# Patient Record
Sex: FEMALE | Race: WHITE | Employment: FULL TIME | URBAN - NONMETROPOLITAN AREA
[De-identification: names, ages, dates, MRNs, and addresses within clinical notes are randomized per-mention and may not be internally consistent; named-entity substitution may affect disease eponyms.]

---

## 2017-01-09 DIAGNOSIS — F90.9 ATTENTION DEFICIT HYPERACTIVITY DISORDER (ADHD), UNSPECIFIED ADHD TYPE: ICD-10-CM

## 2017-01-09 NOTE — TELEPHONE ENCOUNTER
From: Jacki Pollard  To: Jillian Frank DO  Sent: 1/9/2017 8:22 AM CST  Subject: Medication Renewal Request    Original authorizing provider: DO Jacki Oliva would like a refill of the following medications:  Methylphenidate

## 2017-01-10 RX ORDER — METHYLPHENIDATE HYDROCHLORIDE 54 MG/1
54 TABLET ORAL EVERY MORNING
Qty: 30 TABLET | Refills: 0 | Status: SHIPPED | OUTPATIENT
Start: 2017-01-10 | End: 2017-02-08

## 2017-02-08 DIAGNOSIS — F90.2 ATTENTION DEFICIT HYPERACTIVITY DISORDER (ADHD), COMBINED TYPE: ICD-10-CM

## 2017-02-08 DIAGNOSIS — F90.9 ATTENTION DEFICIT HYPERACTIVITY DISORDER (ADHD), UNSPECIFIED ADHD TYPE: ICD-10-CM

## 2017-02-08 RX ORDER — METHYLPHENIDATE HYDROCHLORIDE 54 MG/1
54 TABLET ORAL EVERY MORNING
Qty: 30 TABLET | Refills: 0 | Status: CANCELLED | OUTPATIENT
Start: 2017-02-08

## 2017-02-08 RX ORDER — METHYLPHENIDATE HYDROCHLORIDE 10 MG/1
10 TABLET ORAL DAILY
Qty: 30 TABLET | Refills: 0 | Status: SHIPPED | OUTPATIENT
Start: 2017-04-08 | End: 2017-05-08

## 2017-02-08 RX ORDER — METHYLPHENIDATE HYDROCHLORIDE 10 MG/1
10 TABLET ORAL DAILY
Qty: 30 TABLET | Refills: 0 | Status: CANCELLED | OUTPATIENT
Start: 2017-02-08 | End: 2017-03-10

## 2017-02-08 RX ORDER — METHYLPHENIDATE HYDROCHLORIDE 10 MG/1
10 TABLET ORAL DAILY
Qty: 30 TABLET | Refills: 0 | Status: SHIPPED | OUTPATIENT
Start: 2017-02-08 | End: 2017-08-16

## 2017-02-08 RX ORDER — METHYLPHENIDATE HYDROCHLORIDE 10 MG/1
10 TABLET ORAL DAILY
Qty: 30 TABLET | Refills: 0 | Status: SHIPPED | OUTPATIENT
Start: 2017-03-08 | End: 2017-04-08

## 2017-02-08 RX ORDER — MONTELUKAST SODIUM 10 MG/1
10 TABLET ORAL
Qty: 90 TABLET | Refills: 3 | Status: SHIPPED | OUTPATIENT
Start: 2017-02-08 | End: 2018-01-22

## 2017-02-08 RX ORDER — METHYLPHENIDATE HYDROCHLORIDE 54 MG/1
54 TABLET ORAL EVERY MORNING
Qty: 30 TABLET | Refills: 0 | Status: SHIPPED | OUTPATIENT
Start: 2017-03-08 | End: 2017-04-08

## 2017-02-08 RX ORDER — METHYLPHENIDATE HYDROCHLORIDE 54 MG/1
54 TABLET ORAL EVERY MORNING
Qty: 30 TABLET | Refills: 0 | Status: SHIPPED | OUTPATIENT
Start: 2017-02-08 | End: 2017-11-20 | Stop reason: ALTCHOICE

## 2017-02-08 RX ORDER — METHYLPHENIDATE HYDROCHLORIDE 54 MG/1
54 TABLET ORAL EVERY MORNING
Qty: 30 TABLET | Refills: 0 | Status: SHIPPED | OUTPATIENT
Start: 2017-04-08 | End: 2017-05-08

## 2017-02-08 NOTE — TELEPHONE ENCOUNTER
Last office visit 12-6-16 110/60  Last refill methylphenidate 10mg 11-30-16 #30  Last refill Montelukast 4-8-15 #90 with 3 refills.   Last refill Methylphenidate ER 1-10-17 #30  ACT & AAP UTD

## 2017-02-08 NOTE — TELEPHONE ENCOUNTER
From: Virgilio Barnett  To: Laith Echavarria DO  Sent: 2/8/2017 8:02 AM CST  Subject: Medication Renewal Request    Original authorizing provider: DO Virgilio Caceres would like a refill of the following medications:  methylphenidate

## 2017-02-17 RX ORDER — LEVOTHYROXINE SODIUM 0.03 MG/1
TABLET ORAL
Qty: 90 TABLET | Refills: 2 | Status: SHIPPED | OUTPATIENT
Start: 2017-02-17 | End: 2017-11-16

## 2017-04-03 NOTE — TELEPHONE ENCOUNTER
From: Bharti Dias  To: Lonnie Mina DO  Sent: 4/2/2017 3:59 PM CDT  Subject: Medication Renewal Request    Original authorizing provider: DO Bharti Houston would like a refill of the following medications:  VENLAFAXINE HCL ER

## 2017-04-04 RX ORDER — VENLAFAXINE HYDROCHLORIDE 75 MG/1
75 CAPSULE, EXTENDED RELEASE ORAL DAILY
Qty: 90 CAPSULE | Refills: 1 | Status: SHIPPED | OUTPATIENT
Start: 2017-04-04 | End: 2017-10-12

## 2017-05-08 DIAGNOSIS — F90.9 ATTENTION DEFICIT HYPERACTIVITY DISORDER (ADHD), UNSPECIFIED ADHD TYPE: ICD-10-CM

## 2017-05-08 DIAGNOSIS — G47.00 INSOMNIA, UNSPECIFIED TYPE: Primary | ICD-10-CM

## 2017-05-09 RX ORDER — ZOLPIDEM TARTRATE 10 MG/1
10 TABLET ORAL NIGHTLY
Qty: 90 TABLET | Refills: 0 | Status: SHIPPED | OUTPATIENT
Start: 2017-05-09 | End: 2017-08-14

## 2017-05-09 RX ORDER — MONTELUKAST SODIUM 10 MG/1
10 TABLET ORAL
Qty: 90 TABLET | Refills: 3 | Status: CANCELLED | OUTPATIENT
Start: 2017-05-09

## 2017-05-09 RX ORDER — METHYLPHENIDATE HYDROCHLORIDE 54 MG/1
54 TABLET ORAL DAILY
Qty: 30 TABLET | Refills: 0 | Status: SHIPPED | OUTPATIENT
Start: 2017-05-09 | End: 2017-08-07

## 2017-05-09 RX ORDER — METHYLPHENIDATE HYDROCHLORIDE 54 MG/1
54 TABLET ORAL DAILY
Qty: 30 TABLET | Refills: 0 | Status: SHIPPED | OUTPATIENT
Start: 2017-06-08 | End: 2017-07-08

## 2017-05-09 RX ORDER — VENLAFAXINE HYDROCHLORIDE 75 MG/1
75 CAPSULE, EXTENDED RELEASE ORAL DAILY
Qty: 90 CAPSULE | Refills: 1 | Status: CANCELLED | OUTPATIENT
Start: 2017-05-09

## 2017-05-09 RX ORDER — METHYLPHENIDATE HYDROCHLORIDE 54 MG/1
54 TABLET ORAL DAILY
Qty: 30 TABLET | Refills: 0 | Status: SHIPPED | OUTPATIENT
Start: 2017-07-08 | End: 2017-08-07

## 2017-05-09 RX ORDER — LEVOTHYROXINE SODIUM 0.03 MG/1
TABLET ORAL
Qty: 90 TABLET | Refills: 2 | Status: CANCELLED | OUTPATIENT
Start: 2017-05-09

## 2017-05-09 RX ORDER — METHYLPHENIDATE HYDROCHLORIDE 54 MG/1
54 TABLET ORAL EVERY MORNING
Qty: 30 TABLET | Refills: 0 | Status: CANCELLED | OUTPATIENT
Start: 2017-05-09 | End: 2017-06-08

## 2017-05-09 NOTE — TELEPHONE ENCOUNTER
Last office visit on 12/6/16  Last refill on nexium x 1 year on 9/30/16  Last refill on ambien #90 on 12/4/16  Last refill on concerta 4/8

## 2017-05-09 NOTE — TELEPHONE ENCOUNTER
From: Fran Hoyt  To: Mariusz Chan DO  Sent: 5/8/2017 10:15 PM CDT  Subject: Medication Renewal Request    Original authorizing provider: DO Fran Chávez would like a refill of the following medications:  NEXIUM 40 MG O

## 2017-08-07 ENCOUNTER — TELEPHONE (OUTPATIENT)
Dept: FAMILY MEDICINE CLINIC | Facility: CLINIC | Age: 57
End: 2017-08-07

## 2017-08-07 DIAGNOSIS — F90.9 ATTENTION DEFICIT HYPERACTIVITY DISORDER (ADHD), UNSPECIFIED ADHD TYPE: ICD-10-CM

## 2017-08-07 NOTE — TELEPHONE ENCOUNTER
From: Nikolay Louise  Sent: 8/7/2017 9:38 AM CDT  Subject: Medication Renewal Request    Nikolay Louise would like a refill of the following medications:  Methylphenidate HCl ER (CONCERTA) 54 MG Oral Tab CR [CHRISTIAN Bledsoe, DO]    Preferred pharm

## 2017-08-08 RX ORDER — METHYLPHENIDATE HYDROCHLORIDE 54 MG/1
54 TABLET ORAL DAILY
Qty: 30 TABLET | Refills: 0 | Status: SHIPPED | OUTPATIENT
Start: 2017-08-08 | End: 2017-11-20 | Stop reason: ALTCHOICE

## 2017-08-14 DIAGNOSIS — G47.00 INSOMNIA, UNSPECIFIED TYPE: ICD-10-CM

## 2017-08-15 RX ORDER — ZOLPIDEM TARTRATE 10 MG/1
TABLET ORAL
Qty: 90 TABLET | Refills: 0 | Status: SHIPPED | OUTPATIENT
Start: 2017-08-15 | End: 2018-01-02

## 2017-08-15 RX ORDER — METHYLPHENIDATE HYDROCHLORIDE 54 MG/1
54 TABLET ORAL DAILY
Qty: 30 TABLET | Refills: 0
Start: 2017-08-15 | End: 2017-09-19

## 2017-08-16 DIAGNOSIS — F90.2 ATTENTION DEFICIT HYPERACTIVITY DISORDER (ADHD), COMBINED TYPE: ICD-10-CM

## 2017-08-16 RX ORDER — METHYLPHENIDATE HYDROCHLORIDE 10 MG/1
10 TABLET ORAL DAILY
Qty: 30 TABLET | Refills: 0 | Status: SHIPPED | OUTPATIENT
Start: 2017-08-16 | End: 2017-09-19

## 2017-09-19 ENCOUNTER — TELEPHONE (OUTPATIENT)
Dept: FAMILY MEDICINE CLINIC | Facility: CLINIC | Age: 57
End: 2017-09-19

## 2017-09-19 DIAGNOSIS — F90.2 ATTENTION DEFICIT HYPERACTIVITY DISORDER (ADHD), COMBINED TYPE: ICD-10-CM

## 2017-09-19 DIAGNOSIS — F90.9 ATTENTION DEFICIT HYPERACTIVITY DISORDER (ADHD), UNSPECIFIED ADHD TYPE: ICD-10-CM

## 2017-09-20 RX ORDER — METHYLPHENIDATE HYDROCHLORIDE 54 MG/1
54 TABLET ORAL DAILY
Qty: 30 TABLET | Refills: 0
Start: 2017-09-20 | End: 2017-09-26

## 2017-09-20 RX ORDER — METHYLPHENIDATE HYDROCHLORIDE 10 MG/1
10 TABLET ORAL DAILY
Qty: 30 TABLET | Refills: 0
Start: 2017-09-20 | End: 2017-09-26

## 2017-09-20 NOTE — TELEPHONE ENCOUNTER
From: Stephen Mathur  Sent: 9/19/2017 7:42 PM CDT  Subject: Medication Renewal Request    Stephen Mathur would like a refill of the following medications:     Methylphenidate HCl ER (CONCERTA) 54 MG Oral Tab CR [CHRISTIAN Lee, DO]     methylphen

## 2017-09-26 ENCOUNTER — TELEPHONE (OUTPATIENT)
Dept: FAMILY MEDICINE CLINIC | Facility: CLINIC | Age: 57
End: 2017-09-26

## 2017-09-26 DIAGNOSIS — F90.9 ATTENTION DEFICIT HYPERACTIVITY DISORDER (ADHD), UNSPECIFIED ADHD TYPE: ICD-10-CM

## 2017-09-26 DIAGNOSIS — F90.2 ATTENTION DEFICIT HYPERACTIVITY DISORDER (ADHD), COMBINED TYPE: ICD-10-CM

## 2017-09-26 RX ORDER — METHYLPHENIDATE HYDROCHLORIDE 10 MG/1
10 TABLET ORAL DAILY
Qty: 30 TABLET | Refills: 0 | Status: SHIPPED | OUTPATIENT
Start: 2017-09-26 | End: 2017-11-20 | Stop reason: ALTCHOICE

## 2017-09-26 RX ORDER — METHYLPHENIDATE HYDROCHLORIDE 54 MG/1
54 TABLET ORAL DAILY
Qty: 30 TABLET | Refills: 0 | Status: SHIPPED | OUTPATIENT
Start: 2017-09-26 | End: 2017-11-20 | Stop reason: ALTCHOICE

## 2017-10-13 RX ORDER — VENLAFAXINE HYDROCHLORIDE 75 MG/1
75 CAPSULE, EXTENDED RELEASE ORAL DAILY
Qty: 90 CAPSULE | Refills: 1 | Status: SHIPPED | OUTPATIENT
Start: 2017-10-13 | End: 2018-04-06

## 2017-10-24 DIAGNOSIS — F90.9 ATTENTION DEFICIT HYPERACTIVITY DISORDER (ADHD), UNSPECIFIED ADHD TYPE: ICD-10-CM

## 2017-10-25 RX ORDER — METHYLPHENIDATE HYDROCHLORIDE 54 MG/1
54 TABLET ORAL DAILY
Qty: 30 TABLET | Refills: 0 | Status: SHIPPED | OUTPATIENT
Start: 2017-12-24 | End: 2018-01-23

## 2017-10-25 RX ORDER — METHYLPHENIDATE HYDROCHLORIDE 54 MG/1
54 TABLET ORAL DAILY
Qty: 30 TABLET | Refills: 0 | Status: SHIPPED | OUTPATIENT
Start: 2017-10-25 | End: 2017-11-24

## 2017-10-25 RX ORDER — METHYLPHENIDATE HYDROCHLORIDE 54 MG/1
54 TABLET ORAL DAILY
Qty: 30 TABLET | Refills: 0 | Status: SHIPPED | OUTPATIENT
Start: 2017-11-24 | End: 2017-12-24

## 2017-10-25 RX ORDER — METHYLPHENIDATE HYDROCHLORIDE 54 MG/1
54 TABLET ORAL DAILY
Qty: 30 TABLET | Refills: 0 | Status: CANCELLED | OUTPATIENT
Start: 2017-10-25 | End: 2017-11-24

## 2017-10-25 NOTE — TELEPHONE ENCOUNTER
From: Ira Watkins  Sent: 10/24/2017 5:49 PM CDT  Subject: Medication Renewal Request    Ira Watkins would like a refill of the following medications:     Methylphenidate HCl ER (CONCERTA) 54 MG Oral Tab CR [CHRISTIAN Batista DO]    Preferred

## 2017-10-27 ENCOUNTER — TELEPHONE (OUTPATIENT)
Dept: FAMILY MEDICINE CLINIC | Facility: CLINIC | Age: 57
End: 2017-10-27

## 2017-10-27 DIAGNOSIS — Z00.00 ROUTINE HEALTH MAINTENANCE: Primary | ICD-10-CM

## 2017-10-27 DIAGNOSIS — E03.9 ACQUIRED HYPOTHYROIDISM: ICD-10-CM

## 2017-10-27 DIAGNOSIS — E78.00 HYPERCHOLESTEREMIA: ICD-10-CM

## 2017-10-27 NOTE — TELEPHONE ENCOUNTER
Please send orders for lab work to [facility] and [facility].  Patient has physical scheduled for Nov 21

## 2017-11-11 NOTE — TELEPHONE ENCOUNTER
From: Jacki Pollard  Sent: 11/10/2017 6:16 PM CST  Subject: Medication Renewal Request    Jacki Pollard would like a refill of the following medications:     NEXIUM 40 MG Oral Capsule Delayed Release [CHRISTIAN Loving DO]   Patient Comment: dayanara

## 2017-11-16 RX ORDER — LEVOTHYROXINE SODIUM 0.03 MG/1
TABLET ORAL
Qty: 90 TABLET | Refills: 0 | Status: SHIPPED | OUTPATIENT
Start: 2017-11-16 | End: 2018-02-27

## 2017-11-18 ENCOUNTER — TELEPHONE (OUTPATIENT)
Dept: FAMILY MEDICINE CLINIC | Facility: CLINIC | Age: 57
End: 2017-11-18

## 2017-11-18 RX ORDER — PREDNISONE 20 MG/1
20 TABLET ORAL 2 TIMES DAILY
Qty: 10 TABLET | Refills: 0 | Status: SHIPPED | OUTPATIENT
Start: 2017-11-18 | End: 2017-11-23

## 2017-11-18 NOTE — TELEPHONE ENCOUNTER
Ok per Dr. Mikael Choi to send in script for prednisone 20mg BID x 5 days. Sent to [facility] in [place].

## 2017-11-20 NOTE — H&P
HPI:   Jacki Pollard is a 65year old female who presents for a complete physical exam. Symptoms: is menopausal. Patient complains of needing physical gets gyne with Dr. Betty Darnell worse in fall and spring.       Immunization History  Administered puffs into the lungs every 6 (six) hours as needed for Wheezing. Disp: 3 Inhaler Rfl: 3   Atorvastatin Calcium (LIPITOR) 20 MG Oral Tab Take 1 tablet by mouth nightly.  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 : yes. Children: 2.    Exercise: minimal.  Diet: watches minimally     REVIEW OF SYSTEMS:   GENERAL: feels well otherwise  SKIN: denies any continue synthroid 25 mcg daily    3. Hypercholesteremia  - Lipdis CK, ,ASt  - lipitor 20 mg    4. Gastroesophageal reflux disease without esophagitis  - continue nexium 40 mg   - encouraged to see GI for EGD and colonocsopy    5.  Chronic nonseasonal aller

## 2017-11-20 NOTE — PATIENT INSTRUCTIONS
Tips to Control Acid Reflux    To control acid reflux, you’ll need to make some basic diet and lifestyle changes. The simple steps outlined below may be all you’ll need to ease discomfort. Watch what you eat  · Avoid fatty foods and spicy foods.   · Eat Allergens irritate your eyes, including the lining of the conjunctiva. This causes eyes to become red, itchy, puffy, and watery.   Ear problems  The eustachian tube connects the middle ear to nasal passages.  Allergies can block this tube, and make the ears Cervical cancer All women in this age group, except women who have had a complete hysterectomy Pap test every 3 years or Pap test with human papillomavirus (HPV) test every 5 years   Chlamydia Women at increased risk for infection At routine exams   Colore Hepatitis B Women at increased risk for infection – talk with your healthcare provider 3 doses over 6 months; second dose should be given 1 month after the first dose; the third dose should be given at least 2 months after the second dose and at least 4 mo Use of daily aspirin Women ages 54 and up in this age group who are at risk for cardiovascular health problems such as stroke When your risk is known   Use of tobacco and the health effects it can cause All women in this age group Every exam   1Amerdriss Ca

## 2017-11-21 ENCOUNTER — OFFICE VISIT (OUTPATIENT)
Dept: FAMILY MEDICINE CLINIC | Facility: CLINIC | Age: 57
End: 2017-11-21

## 2017-11-21 VITALS
BODY MASS INDEX: 28.61 KG/M2 | WEIGHT: 153.5 LBS | OXYGEN SATURATION: 98 % | SYSTOLIC BLOOD PRESSURE: 120 MMHG | TEMPERATURE: 99 F | DIASTOLIC BLOOD PRESSURE: 80 MMHG | HEART RATE: 68 BPM | HEIGHT: 61.5 IN

## 2017-11-21 DIAGNOSIS — J30.89 CHRONIC NONSEASONAL ALLERGIC RHINITIS DUE TO OTHER ALLERGEN: ICD-10-CM

## 2017-11-21 DIAGNOSIS — J01.00 ACUTE NON-RECURRENT MAXILLARY SINUSITIS: ICD-10-CM

## 2017-11-21 DIAGNOSIS — E78.00 HYPERCHOLESTEREMIA: ICD-10-CM

## 2017-11-21 DIAGNOSIS — Z12.11 SCREENING FOR COLON CANCER: ICD-10-CM

## 2017-11-21 DIAGNOSIS — J45.20 ASTHMA, STABLE, MILD INTERMITTENT: ICD-10-CM

## 2017-11-21 DIAGNOSIS — K21.9 GASTROESOPHAGEAL REFLUX DISEASE WITHOUT ESOPHAGITIS: ICD-10-CM

## 2017-11-21 DIAGNOSIS — E03.9 ACQUIRED HYPOTHYROIDISM: ICD-10-CM

## 2017-11-21 DIAGNOSIS — Z00.00 ROUTINE HEALTH MAINTENANCE: Primary | ICD-10-CM

## 2017-11-21 PROCEDURE — 99396 PREV VISIT EST AGE 40-64: CPT | Performed by: FAMILY MEDICINE

## 2017-11-21 RX ORDER — AZITHROMYCIN 250 MG/1
TABLET, FILM COATED ORAL
Qty: 6 TABLET | Refills: 0 | Status: SHIPPED | OUTPATIENT
Start: 2017-11-21 | End: 2018-01-05 | Stop reason: ALTCHOICE

## 2017-12-15 DIAGNOSIS — E78.00 HYPERCHOLESTEREMIA: ICD-10-CM

## 2017-12-16 NOTE — TELEPHONE ENCOUNTER
From: More Frost  Sent: 12/15/2017 9:47 PM CST  Subject: Medication Renewal Request    More Frost would like a refill of the following medications:     Atorvastatin Calcium (LIPITOR) 20 MG Oral Tab [CHRISTIAN Keita DO]    Preferred pharma

## 2017-12-18 RX ORDER — ATORVASTATIN CALCIUM 20 MG/1
20 TABLET, FILM COATED ORAL NIGHTLY
Qty: 90 TABLET | Refills: 3 | Status: SHIPPED
Start: 2017-12-18 | End: 2018-04-27 | Stop reason: ALTCHOICE

## 2017-12-30 ENCOUNTER — TELEPHONE (OUTPATIENT)
Dept: FAMILY MEDICINE CLINIC | Facility: CLINIC | Age: 57
End: 2017-12-30

## 2017-12-30 RX ORDER — PREDNISONE 20 MG/1
20 TABLET ORAL 2 TIMES DAILY
Qty: 10 TABLET | Refills: 0 | Status: SHIPPED | OUTPATIENT
Start: 2017-12-30 | End: 2018-01-04

## 2017-12-30 RX ORDER — CEPHALEXIN 500 MG/1
500 CAPSULE ORAL 3 TIMES DAILY
Qty: 30 CAPSULE | Refills: 0 | Status: SHIPPED | OUTPATIENT
Start: 2017-12-30 | End: 2018-01-09

## 2017-12-30 NOTE — TELEPHONE ENCOUNTER
Patient states that she has a fever. Cough. Red throat. Grandchild was diagnosed with strep. She states this illness has triggered her asthma. She is asking for prednisone and keflex to be called to pharmacy.   Ok per Dr. Jaison Raphael to send in script for p

## 2018-01-02 DIAGNOSIS — G47.00 INSOMNIA, UNSPECIFIED TYPE: ICD-10-CM

## 2018-01-02 RX ORDER — ZOLPIDEM TARTRATE 10 MG/1
TABLET ORAL
Qty: 90 TABLET | Refills: 0 | Status: SHIPPED | OUTPATIENT
Start: 2018-01-02 | End: 2018-05-26

## 2018-01-02 RX ORDER — LEVALBUTEROL INHALATION SOLUTION 0.63 MG/3ML
SOLUTION RESPIRATORY (INHALATION)
Qty: 72 ML | Refills: 0 | Status: SHIPPED | OUTPATIENT
Start: 2018-01-02 | End: 2020-03-15

## 2018-01-05 ENCOUNTER — OFFICE VISIT (OUTPATIENT)
Dept: FAMILY MEDICINE CLINIC | Facility: CLINIC | Age: 58
End: 2018-01-05

## 2018-01-05 VITALS
DIASTOLIC BLOOD PRESSURE: 84 MMHG | WEIGHT: 158 LBS | BODY MASS INDEX: 29 KG/M2 | TEMPERATURE: 99 F | SYSTOLIC BLOOD PRESSURE: 124 MMHG

## 2018-01-05 DIAGNOSIS — J45.901 ASTHMA EXACERBATION IN COPD (HCC): ICD-10-CM

## 2018-01-05 DIAGNOSIS — J44.1 ASTHMA EXACERBATION IN COPD (HCC): ICD-10-CM

## 2018-01-05 DIAGNOSIS — J20.9 BRONCHITIS WITH BRONCHOSPASM: Primary | ICD-10-CM

## 2018-01-05 PROCEDURE — 99214 OFFICE O/P EST MOD 30 MIN: CPT | Performed by: FAMILY MEDICINE

## 2018-01-05 RX ORDER — PREDNISONE 20 MG/1
TABLET ORAL
Qty: 30 TABLET | Refills: 0 | Status: SHIPPED | OUTPATIENT
Start: 2018-01-05 | End: 2018-04-26

## 2018-01-05 RX ORDER — CLARITHROMYCIN 500 MG/1
500 TABLET, COATED ORAL 2 TIMES DAILY
Qty: 20 TABLET | Refills: 0 | Status: SHIPPED | OUTPATIENT
Start: 2018-01-05 | End: 2018-01-15

## 2018-01-05 NOTE — PROGRESS NOTES
HPI:   Delia Kam is a 65year old female who presents for upper respiratory symptoms for  2  months. Patient reports congestion, cough is keeping pt up at night was on zpack and now on keflex. Did 2 courses prednisone.   Using zoponex in neb every (two) times daily. Disp: 60 Each Rfl: 3   Methylphenidate HCl ER (CONCERTA) 54 MG Oral Tab CR Take 1 tablet (54 mg total) by mouth daily.  Disp: 30 tablet Rfl: 0   Methylphenidate HCl ER (CONCERTA) 54 MG Oral Tab CR Take 1 tablet (54 mg total) by mouth celestino nourished,in no apparent distress  SKIN: no rashes,no suspicious lesions  EYES:,conjunctiva are injected  HEENT: ears and throat are clear, nares yellow mucoid d/c  NECK: supple,no adenopathy,no bruits  LUNGS: diffuse insp and exp wheeze no rhonchi  CARDIO

## 2018-01-05 NOTE — PATIENT INSTRUCTIONS
Finish biaxin 500 mg twice a day x 10 days  mucinex  Prednisone 20 mg bid x 5 days  breo daily  duoneb every 6 hours  Albuterol as needed    Stop keflex  biaxin 500 mg bid x 10 days  Prednisone 60 mg x 3 , 40 mg x 30 mg x 3, 20 mg x 3  Sample breo given an

## 2018-01-11 NOTE — PATIENT INSTRUCTIONS
Pneumonia (Adult)  Pneumonia is an infection deep within the lungs. It is in the small air sacs (alveoli). Pneumonia may be caused by a virus or bacteria. Pneumonia caused by bacteria is usually treated with an antibiotic.  Severe cases may need to be mayra If you are 72 or older, you should get a pneumococcal vaccine and a yearly flu (influenza) shot. You should also get these vaccines if you have chronic lung disease like asthma, emphysema, or COPD.  Recently, a second type of pneumonia vaccine has become av

## 2018-01-11 NOTE — PROGRESS NOTES
HPI:   Nikolay Louise is a 65year old female who presents for follow up on pneumonia. Is on biaxin, prednisone, and breo. She feels she is starting to get better. Using xoponex 2-3 x per day. On singulair and allegra. Has 2 days biaxin left.  No fever Albuterol Sulfate HFA (VENTOLIN) 108 (90 BASE) MCG/ACT Inhalation Aero Soln Inhale 2 puffs into the lungs every 6 (six) hours as needed for Wheezing.  Disp: 3 Inhaler Rfl: 3      Past Medical History:   Diagnosis Date   • ADHD (attention deficit hyperacti atraumatic, normocephalic,ears and throat are clear  NECK: supple,no adenopath  LUNGS: CTA wheeze with cough  CARDIO: RRR without murmur  GI: good BS's,no masses, HSM or tenderness    ASSESSMENT AND PLAN:   Guillermo Siddiqui is a 65year old female who p

## 2018-01-12 ENCOUNTER — OFFICE VISIT (OUTPATIENT)
Dept: FAMILY MEDICINE CLINIC | Facility: CLINIC | Age: 58
End: 2018-01-12

## 2018-01-12 ENCOUNTER — TELEPHONE (OUTPATIENT)
Dept: FAMILY MEDICINE CLINIC | Facility: CLINIC | Age: 58
End: 2018-01-12

## 2018-01-12 VITALS
DIASTOLIC BLOOD PRESSURE: 84 MMHG | WEIGHT: 157.13 LBS | SYSTOLIC BLOOD PRESSURE: 122 MMHG | TEMPERATURE: 98 F | BODY MASS INDEX: 29 KG/M2

## 2018-01-12 DIAGNOSIS — J18.9 PNEUMONIA OF BOTH LOWER LOBES DUE TO INFECTIOUS ORGANISM: ICD-10-CM

## 2018-01-12 DIAGNOSIS — Z09 RESOLVED CONDITION, FOLLOW-UP: Primary | ICD-10-CM

## 2018-01-12 PROCEDURE — 99213 OFFICE O/P EST LOW 20 MIN: CPT | Performed by: FAMILY MEDICINE

## 2018-01-12 NOTE — TELEPHONE ENCOUNTER
NEED NOTE FOR WORK STATING SHE IS CLEAR TO GO BACK, FAX TO OB DEPT @ [facility]  [phone]

## 2018-01-22 ENCOUNTER — PATIENT MESSAGE (OUTPATIENT)
Dept: FAMILY MEDICINE CLINIC | Facility: CLINIC | Age: 58
End: 2018-01-22

## 2018-01-22 DIAGNOSIS — J44.1 ASTHMA EXACERBATION IN COPD (HCC): ICD-10-CM

## 2018-01-22 DIAGNOSIS — J45.901 ASTHMA EXACERBATION IN COPD (HCC): ICD-10-CM

## 2018-01-22 DIAGNOSIS — J20.9 BRONCHITIS WITH BRONCHOSPASM: ICD-10-CM

## 2018-01-22 NOTE — TELEPHONE ENCOUNTER
From: Lawrence Conteh  To: Aniket Mohamud DO  Sent: 1/22/2018 9:52 AM CST  Subject: Prescription Question    Productive cough is turning yellow again. May need that second round of antibiotics you mentioned after all.  I can be reached at home [phone]

## 2018-01-22 NOTE — TELEPHONE ENCOUNTER
Montelukast 2/8/17 #90x3  Clarithromycin 1/5/18 #20x0  Last ov 1/12/18    LMTCB to see why pt is needing the Clarithromycin refilled? charlie

## 2018-01-23 RX ORDER — MONTELUKAST SODIUM 10 MG/1
TABLET ORAL
Qty: 90 TABLET | Refills: 0 | Status: SHIPPED | OUTPATIENT
Start: 2018-01-23 | End: 2018-04-06

## 2018-01-23 RX ORDER — CLARITHROMYCIN 500 MG/1
TABLET, COATED ORAL
Qty: 20 TABLET | Refills: 0 | OUTPATIENT
Start: 2018-01-23

## 2018-02-03 ENCOUNTER — PATIENT MESSAGE (OUTPATIENT)
Dept: FAMILY MEDICINE CLINIC | Facility: CLINIC | Age: 58
End: 2018-02-03

## 2018-02-03 NOTE — TELEPHONE ENCOUNTER
From: More Frost  To: Katherine Miller DO  Sent: 2/3/2018 10:12 AM CST  Subject: Prescription Question    I need written scripts for my:    Methylphenidate 81CN daily  Concerta 40SL ER      (It is very helpful when I get 3 months scripts at a time,

## 2018-02-07 RX ORDER — METHYLPHENIDATE HYDROCHLORIDE 54 MG/1
54 TABLET ORAL DAILY
Qty: 30 TABLET | Refills: 0 | Status: SHIPPED | OUTPATIENT
Start: 2018-04-08 | End: 2018-04-26

## 2018-02-07 RX ORDER — METHYLPHENIDATE HYDROCHLORIDE 54 MG/1
54 TABLET ORAL DAILY
Qty: 30 TABLET | Refills: 0 | Status: SHIPPED | OUTPATIENT
Start: 2018-02-07 | End: 2018-04-26

## 2018-02-07 RX ORDER — METHYLPHENIDATE HYDROCHLORIDE 54 MG/1
54 TABLET ORAL DAILY
Qty: 30 TABLET | Refills: 0 | Status: SHIPPED | OUTPATIENT
Start: 2018-03-09 | End: 2018-04-26

## 2018-02-21 DIAGNOSIS — F90.2 ATTENTION DEFICIT HYPERACTIVITY DISORDER (ADHD), COMBINED TYPE: ICD-10-CM

## 2018-02-21 RX ORDER — METHYLPHENIDATE HYDROCHLORIDE 10 MG/1
10 TABLET ORAL DAILY
Qty: 30 TABLET | Refills: 0 | Status: SHIPPED | OUTPATIENT
Start: 2018-02-21 | End: 2018-04-26

## 2018-02-21 RX ORDER — OLOPATADINE HYDROCHLORIDE 2 MG/ML
SOLUTION/ DROPS OPHTHALMIC
Qty: 2.5 ML | Refills: 3 | Status: SHIPPED | OUTPATIENT
Start: 2018-02-21

## 2018-02-21 NOTE — TELEPHONE ENCOUNTER
Methylphenidate 10mg   Patient would like to  this afternoon.   She stated that she had requested the 10mg at the same time as the 54mg but only the 54mg was filled

## 2018-02-27 RX ORDER — LEVOTHYROXINE SODIUM 0.03 MG/1
TABLET ORAL
Qty: 90 TABLET | Refills: 0 | Status: SHIPPED | OUTPATIENT
Start: 2018-02-27 | End: 2018-05-08

## 2018-04-06 RX ORDER — MONTELUKAST SODIUM 10 MG/1
TABLET ORAL
Qty: 90 TABLET | Refills: 0 | Status: SHIPPED | OUTPATIENT
Start: 2018-04-06 | End: 2018-06-27

## 2018-04-06 RX ORDER — VENLAFAXINE HYDROCHLORIDE 75 MG/1
CAPSULE, EXTENDED RELEASE ORAL
Qty: 90 CAPSULE | Refills: 0 | Status: SHIPPED | OUTPATIENT
Start: 2018-04-06 | End: 2018-06-28

## 2018-04-06 NOTE — TELEPHONE ENCOUNTER
Last office visit 1-12-18  Last refill Montelukast 1-23-18 #90  Last refill Venlafaxine 10-13-17 #90 with 1

## 2018-04-24 ENCOUNTER — MED REC SCAN ONLY (OUTPATIENT)
Dept: FAMILY MEDICINE CLINIC | Facility: CLINIC | Age: 58
End: 2018-04-24

## 2018-04-26 PROBLEM — I21.3 ST ELEVATION MYOCARDIAL INFARCTION (STEMI) (HCC): Status: ACTIVE | Noted: 2018-04-26

## 2018-04-26 PROBLEM — I51.81 BROKEN HEART SYNDROME: Status: ACTIVE | Noted: 2018-04-26

## 2018-04-27 ENCOUNTER — OFFICE VISIT (OUTPATIENT)
Dept: FAMILY MEDICINE CLINIC | Facility: CLINIC | Age: 58
End: 2018-04-27

## 2018-04-27 ENCOUNTER — TELEPHONE (OUTPATIENT)
Dept: FAMILY MEDICINE CLINIC | Facility: CLINIC | Age: 58
End: 2018-04-27

## 2018-04-27 VITALS
WEIGHT: 152.13 LBS | TEMPERATURE: 99 F | SYSTOLIC BLOOD PRESSURE: 126 MMHG | BODY MASS INDEX: 28 KG/M2 | DIASTOLIC BLOOD PRESSURE: 80 MMHG

## 2018-04-27 DIAGNOSIS — I51.81 BROKEN HEART SYNDROME: Primary | ICD-10-CM

## 2018-04-27 DIAGNOSIS — I51.81 TAKOTSUBO SYNDROME: ICD-10-CM

## 2018-04-27 DIAGNOSIS — R73.9 HYPERGLYCEMIA: ICD-10-CM

## 2018-04-27 DIAGNOSIS — I21.3 ST ELEVATION MYOCARDIAL INFARCTION (STEMI), UNSPECIFIED ARTERY (HCC): ICD-10-CM

## 2018-04-27 DIAGNOSIS — I51.81 STRESS-INDUCED CARDIOMYOPATHY: ICD-10-CM

## 2018-04-27 DIAGNOSIS — F90.9 ATTENTION DEFICIT HYPERACTIVITY DISORDER (ADHD), UNSPECIFIED ADHD TYPE: ICD-10-CM

## 2018-04-27 DIAGNOSIS — E78.00 HYPERCHOLESTEREMIA: ICD-10-CM

## 2018-04-27 DIAGNOSIS — K21.9 GASTROESOPHAGEAL REFLUX DISEASE WITHOUT ESOPHAGITIS: ICD-10-CM

## 2018-04-27 DIAGNOSIS — I21.3 ST ELEVATION MYOCARDIAL INFARCTION (STEMI), UNSPECIFIED ARTERY (HCC): Primary | ICD-10-CM

## 2018-04-27 PROCEDURE — 1111F DSCHRG MED/CURRENT MED MERGE: CPT | Performed by: FAMILY MEDICINE

## 2018-04-27 PROCEDURE — 99214 OFFICE O/P EST MOD 30 MIN: CPT | Performed by: FAMILY MEDICINE

## 2018-04-27 RX ORDER — ROSUVASTATIN CALCIUM 40 MG/1
20 TABLET, COATED ORAL NIGHTLY
COMMUNITY
End: 2019-01-17 | Stop reason: DRUGHIGH

## 2018-04-27 NOTE — TELEPHONE ENCOUNTER
NEED ORDER FOR CARDIAC REHAB SENT TO [facility], [address] [facility] # [phone]

## 2018-04-27 NOTE — PATIENT INSTRUCTIONS
Know the Medicines You’re Taking    You should know certain details about your medicines. This helps you take them correctly and safely. For each medicine, ask your doctor or pharmacist the questions below. Write down the answers so you don’t forget.  The © 6598-3589 [employer] [address]. [address] [facility] [place] [place]. All rights reserved. This information is not intended as a substitute for professional medical care. Always follow your healthcare professional's instructions.         Symptom Older people may also have atypical symptoms. The symptoms include loss of consciousness (syncope), weakness, or confusion (delirium). These symptoms should be looked at right away. Ignoring them can lead to critical illness or death.   If you have diabetes See your health care provider for follow-up visits as directed. During these visits, your provider will ask you about your medications and how well they are working. If needed, your provider may change your dosage or prescribe new medications.  You may have Your loved one will have ups and downs. That’s normal. Help him or her focus on the positive. Sticking to lifestyle changes will help your loved one feel better and be healthier.  Keep in mind that if you make the same changes, it can help your loved one ma Experts do know that intense emotions such as grief, fear, or sadness may trigger TCM. That’s why the condition is sometimes called broken heart syndrome. A sudden illness may also precede it.  TCM might be triggered by:  · Death of a loved one  · Domestic · Blood-thinner medicines (anticoagulants) to help prevent stroke  · Psychological therapy to address problems such as anxiety and stress  Most people fully recover from TCM in 1 to 4 weeks. However, some do not, especially if they are older in age.  Short-

## 2018-04-27 NOTE — PROGRESS NOTES
Sari Cat is a 65year old female. HPI:   Jeannie Martínez is here for follow up for Tokusobu cardiomyopathy. Onset after stressor at her home last Thursday. She presented with CP at [facility]- ER. EKG with ST elevation and nitro x 2. Morphine. Given TPA x 2. Inhaler Rfl: 3   Cetirizine HCl (ZYRTEC ALLERGY) 10 MG Oral Cap Take  by mouth once daily.  Disp:  Rfl:    Fluticasone-Salmeterol (ADVAIR DISKUS) 250-50 MCG/DOSE Inhalation Aerosol Powder, Breath Activated Inhale 3 Inhalers into the lungs 2 (two) times celestino mg daily    6. Attention deficit hyperactivity disorder (ADHD), unspecified ADHD type  - no meds at this time  - possible strattera if ok per stephanie after getting  Echo in 2 months    7.  HGBA1C elevated  - HGBA1C          The patient indicates understandi

## 2018-05-08 ENCOUNTER — TELEPHONE (OUTPATIENT)
Dept: FAMILY MEDICINE CLINIC | Facility: CLINIC | Age: 58
End: 2018-05-08

## 2018-05-08 RX ORDER — LEVOTHYROXINE SODIUM 0.03 MG/1
TABLET ORAL
Qty: 90 TABLET | Refills: 1 | Status: SHIPPED | OUTPATIENT
Start: 2018-05-08 | End: 2018-11-04

## 2018-05-16 ENCOUNTER — PATIENT MESSAGE (OUTPATIENT)
Dept: FAMILY MEDICINE CLINIC | Facility: CLINIC | Age: 58
End: 2018-05-16

## 2018-05-16 NOTE — TELEPHONE ENCOUNTER
From: Gloria Allen  To: Catherine Cerda DO  Sent: 5/16/2018 7:29 AM CDT  Subject: Non-Urgent Medical Question    Been getting a little dizzy when I get done on treadmill, but my vs are okay.  I have been prone to motion sickness since childhood and w

## 2018-05-17 ENCOUNTER — TELEPHONE (OUTPATIENT)
Dept: FAMILY MEDICINE CLINIC | Facility: CLINIC | Age: 58
End: 2018-05-17

## 2018-05-18 ENCOUNTER — TELEPHONE (OUTPATIENT)
Dept: FAMILY MEDICINE CLINIC | Facility: CLINIC | Age: 58
End: 2018-05-18

## 2018-05-18 NOTE — TELEPHONE ENCOUNTER
Ally, your HGBA1C is good at 5.6. You are not diabetic. Your blood sugar was most likely elevated due to the stress you were under. I have not received the new FMLA forms yet.  Is the HR going to fax it over or are you dropping them off?

## 2018-05-26 DIAGNOSIS — G47.00 INSOMNIA, UNSPECIFIED TYPE: ICD-10-CM

## 2018-05-26 NOTE — TELEPHONE ENCOUNTER
received fax from [address]  pt needs refill on xanax  LOV- 4/27/2018  Last refill- 1/2/2018 #90 with no refills. Medication pending in encounter.

## 2018-05-29 RX ORDER — ZOLPIDEM TARTRATE 10 MG/1
TABLET ORAL
Qty: 90 TABLET | Refills: 0 | Status: SHIPPED | OUTPATIENT
Start: 2018-05-29 | End: 2018-09-21

## 2018-05-30 ENCOUNTER — PATIENT MESSAGE (OUTPATIENT)
Dept: FAMILY MEDICINE CLINIC | Facility: CLINIC | Age: 58
End: 2018-05-30

## 2018-05-30 DIAGNOSIS — M54.9 BACK PAIN, UNSPECIFIED BACK LOCATION, UNSPECIFIED BACK PAIN LATERALITY, UNSPECIFIED CHRONICITY: Primary | ICD-10-CM

## 2018-06-01 ENCOUNTER — TELEPHONE (OUTPATIENT)
Dept: FAMILY MEDICINE CLINIC | Facility: CLINIC | Age: 58
End: 2018-06-01

## 2018-06-01 ENCOUNTER — MED REC SCAN ONLY (OUTPATIENT)
Dept: FAMILY MEDICINE CLINIC | Facility: CLINIC | Age: 58
End: 2018-06-01

## 2018-06-01 NOTE — TELEPHONE ENCOUNTER
Mammogram normal. Repeat in 1 year. Continue monthly self breast exam. Follow up with any palpable or visible abnormality.

## 2018-06-01 NOTE — TELEPHONE ENCOUNTER
From: Jelena Russell  To: Yeison Martinez DO  Sent: 5/30/2018 5:13 PM CDT  Subject: Non-Urgent Medical Question    Argenis Zuñiga in PT volunteered to help with my stress recovery and   Recommended I have you send an order for massage for stress relief/ back

## 2018-06-04 ENCOUNTER — TELEPHONE (OUTPATIENT)
Dept: FAMILY MEDICINE CLINIC | Facility: CLINIC | Age: 58
End: 2018-06-04

## 2018-06-04 DIAGNOSIS — M79.605 LEFT LEG PAIN: Primary | ICD-10-CM

## 2018-06-04 NOTE — TELEPHONE ENCOUNTER
Order for venous doppler of LLE sent to [facility] STAT per Dr. Elana Alba. Patient requested order to be done STAT.

## 2018-06-04 NOTE — TELEPHONE ENCOUNTER
SAW DR Sara Marlow AND MENTIONED ISSUE AND DR Sofia Mi RECOMMENDED SHE HAVE VENOUS DOPPLER DONE BEFORE CARDIAC REHAB TODAY AT 1:30   PLEASE ADVISE

## 2018-06-06 ENCOUNTER — TELEPHONE (OUTPATIENT)
Dept: FAMILY MEDICINE CLINIC | Facility: CLINIC | Age: 58
End: 2018-06-06

## 2018-06-28 RX ORDER — MONTELUKAST SODIUM 10 MG/1
TABLET ORAL
Qty: 90 TABLET | Refills: 0 | Status: SHIPPED | OUTPATIENT
Start: 2018-06-28 | End: 2018-09-21

## 2018-06-28 RX ORDER — VENLAFAXINE HYDROCHLORIDE 75 MG/1
CAPSULE, EXTENDED RELEASE ORAL
Qty: 90 CAPSULE | Refills: 0 | Status: SHIPPED | OUTPATIENT
Start: 2018-06-28 | End: 2018-09-21

## 2018-09-21 DIAGNOSIS — G47.00 INSOMNIA, UNSPECIFIED TYPE: ICD-10-CM

## 2018-09-21 RX ORDER — VENLAFAXINE HYDROCHLORIDE 75 MG/1
75 CAPSULE, EXTENDED RELEASE ORAL DAILY
Qty: 90 CAPSULE | Refills: 0 | Status: SHIPPED | OUTPATIENT
Start: 2018-09-21 | End: 2018-12-31

## 2018-09-21 RX ORDER — ZOLPIDEM TARTRATE 10 MG/1
TABLET ORAL
Qty: 90 TABLET | Refills: 0 | Status: SHIPPED
Start: 2018-09-21 | End: 2019-01-03

## 2018-09-21 RX ORDER — MONTELUKAST SODIUM 10 MG/1
10 TABLET ORAL NIGHTLY
Qty: 90 TABLET | Refills: 0 | Status: SHIPPED | OUTPATIENT
Start: 2018-09-21 | End: 2018-10-02

## 2018-10-02 RX ORDER — VENLAFAXINE HYDROCHLORIDE 75 MG/1
CAPSULE, EXTENDED RELEASE ORAL
Qty: 90 CAPSULE | Refills: 0 | OUTPATIENT
Start: 2018-10-02

## 2018-10-02 RX ORDER — MONTELUKAST SODIUM 10 MG/1
TABLET ORAL
Qty: 90 TABLET | Refills: 1 | Status: SHIPPED | OUTPATIENT
Start: 2018-10-02 | End: 2019-06-29

## 2018-11-05 RX ORDER — LEVOTHYROXINE SODIUM 0.03 MG/1
TABLET ORAL
Qty: 90 TABLET | Refills: 0 | Status: SHIPPED | OUTPATIENT
Start: 2018-11-05 | End: 2019-02-02

## 2018-12-07 DIAGNOSIS — E03.9 ACQUIRED HYPOTHYROIDISM: ICD-10-CM

## 2018-12-07 DIAGNOSIS — Z00.00 ROUTINE HEALTH MAINTENANCE: Primary | ICD-10-CM

## 2018-12-07 DIAGNOSIS — E78.00 HYPERCHOLESTEREMIA: ICD-10-CM

## 2018-12-31 RX ORDER — VENLAFAXINE HYDROCHLORIDE 75 MG/1
CAPSULE, EXTENDED RELEASE ORAL
Qty: 90 CAPSULE | Refills: 0 | Status: SHIPPED | OUTPATIENT
Start: 2018-12-31 | End: 2019-02-28

## 2018-12-31 NOTE — TELEPHONE ENCOUNTER
Last office visit: 04/26/18   last refill:  09/21/18  #90 no refills    Future Appointments   Date Time Provider Tiff Camacho   1/9/2019 10:30 AM Aj Armas DO [facility]

## 2019-01-03 DIAGNOSIS — G47.00 INSOMNIA, UNSPECIFIED TYPE: ICD-10-CM

## 2019-01-04 RX ORDER — ZOLPIDEM TARTRATE 10 MG/1
TABLET ORAL
Qty: 90 TABLET | Refills: 0 | Status: SHIPPED
Start: 2019-01-04 | End: 2019-05-22

## 2019-01-09 ENCOUNTER — TELEPHONE (OUTPATIENT)
Dept: FAMILY MEDICINE CLINIC | Facility: CLINIC | Age: 59
End: 2019-01-09

## 2019-01-09 ENCOUNTER — OFFICE VISIT (OUTPATIENT)
Dept: FAMILY MEDICINE CLINIC | Facility: CLINIC | Age: 59
End: 2019-01-09
Payer: COMMERCIAL

## 2019-01-09 VITALS
OXYGEN SATURATION: 98 % | SYSTOLIC BLOOD PRESSURE: 134 MMHG | HEIGHT: 60 IN | HEART RATE: 70 BPM | BODY MASS INDEX: 29.94 KG/M2 | DIASTOLIC BLOOD PRESSURE: 80 MMHG | WEIGHT: 152.5 LBS | TEMPERATURE: 98 F

## 2019-01-09 DIAGNOSIS — E03.9 ACQUIRED HYPOTHYROIDISM: ICD-10-CM

## 2019-01-09 DIAGNOSIS — R73.03 PREDIABETES: ICD-10-CM

## 2019-01-09 DIAGNOSIS — K21.9 GASTROESOPHAGEAL REFLUX DISEASE WITHOUT ESOPHAGITIS: ICD-10-CM

## 2019-01-09 DIAGNOSIS — J30.89 NON-SEASONAL ALLERGIC RHINITIS DUE TO OTHER ALLERGIC TRIGGER: ICD-10-CM

## 2019-01-09 DIAGNOSIS — G47.00 INSOMNIA, UNSPECIFIED TYPE: ICD-10-CM

## 2019-01-09 DIAGNOSIS — E78.00 HYPERCHOLESTEREMIA: ICD-10-CM

## 2019-01-09 DIAGNOSIS — Z01.419 WELL WOMAN EXAM WITH ROUTINE GYNECOLOGICAL EXAM: Primary | ICD-10-CM

## 2019-01-09 DIAGNOSIS — E03.9 ACQUIRED HYPOTHYROIDISM: Primary | ICD-10-CM

## 2019-01-09 DIAGNOSIS — I51.81 TAKOTSUBO SYNDROME: ICD-10-CM

## 2019-01-09 DIAGNOSIS — F32.89 OTHER DEPRESSION: ICD-10-CM

## 2019-01-09 PROCEDURE — 88175 CYTOPATH C/V AUTO FLUID REDO: CPT | Performed by: FAMILY MEDICINE

## 2019-01-09 PROCEDURE — 99396 PREV VISIT EST AGE 40-64: CPT | Performed by: FAMILY MEDICINE

## 2019-01-09 NOTE — H&P
HPI:   Gloria Allen is a 63year old female who presents for a complete physical exam. Symptoms: is menopausal. Patient complains of needing physical and had labs this morning at [facility]      Immunization History  Administered            Date(s) Elyse Silva (ADVAIR DISKUS) 250-50 MCG/DOSE Inhalation Aerosol Powder, Breath Activated Inhale 3 Inhalers into the lungs 2 (two) times daily.  Disp: 60 Each Rfl: 3   LEVALBUTEROL HCL 0.63 MG/3ML Inhalation Nebu Soln USE 1 VIAL FOR NEBULIZER EVERY 4 HOURS AS NEEDED FOR heartburn  : denies dysuria, vaginal discharge or itching no periods  MUSCULOSKELETAL: denies back pain  NEURO: denies headaches  PSYCHE: denies depression no anxiety  HEMATOLOGIC: denies hx of anemia  ENDOCRINE: + thyroid history  ALL/ASTHMA: hx of carissa esophagitis  - nexium 40 mg daily    8. Acquired hypothyroidism  - TSH  - synthroid 25 mcg      thin prep Pap and pelvic done. Order put in for mammogram. Self breast exam explained. Health maintenance, will check fasting Lipids, CMP, and CBC.  Pt referred

## 2019-01-09 NOTE — PATIENT INSTRUCTIONS
Controlling Allergens: In the Home  Even a clean home can be full of allergens, so take a moment to see what you can do to cut down on allergens in each room of your home. Try to avoid things like cigarette smoke and perfume.  They can irritate your eyes, © 9179-0581 [address]. [address] [facility] [place] [address] [place]. All rights reserved. This information is not intended as a substitute for professional medical care. Always follow your healthcare professional's instructions.         Sleep a · Walk, stretch, or massage restless legs. · Avoid or limit coffee, black tea, and cola. These may keep you awake at night. · Try relaxation techniques. Motherhood  · Ask for help when you need it. Accept help when it’s offered.   · Many new mothers feel Screening tests and vaccines are an important part of managing your health. A screening test is done to find possible disorders or diseases in people who don't have any symptoms.  The goal is to find a disease early so lifestyle changes can be made and you Gonorrhea Sexually active women at increased risk for infection At routine exams   Hepatitis C Anyone at increased risk; 1 time for those born between [facility] At routine exams   High cholesterol or triglycerides All women ages 39 and older who are at Pneumococcal conjugate vaccine (PCV13) and pneumococcal polysaccharide vaccine (PPSV23) Women at increased risk for infection–talk with your healthcare provider 1 or 2 doses   Tetanus/diphtheria/pertussis (Td/Tdap) booster All women in this age group A one Cervical cells, even normal ones, are always changing. As they mature, normal squamous cells move from deeper layers within the cervix. Over time, these cells flatten and cover the surface of the cervix. Within the cervical canal, the cells are different. · Cancer. Different types of cancer may be detected by your Pap test. More tests to assess the cancer's extent are likely. The type of treatment will depend on the test results and other factors, such as age and health history.  (Reported as squamous cell c

## 2019-01-10 NOTE — TELEPHONE ENCOUNTER
Micki Render your CBC shows slightly elevated eosinophils which supports your allergy history. You are not anemic    Your comp is good. Your  HGBA1C is 6 indicating you are prediabetic. You can work on [employer] as you have been doing.  Or we can start [employer]

## 2019-01-11 LAB
LAST PAP RESULT: NORMAL
PAP HISTORY (OTHER THAN LAST PAP): NORMAL

## 2019-01-14 NOTE — TELEPHONE ENCOUNTER
I called the pt to see if she received her [employer] message regarding her lab results.  Raj Duarte

## 2019-01-17 RX ORDER — ATORVASTATIN CALCIUM 20 MG/1
20 TABLET, FILM COATED ORAL NIGHTLY
Qty: 90 TABLET | Refills: 1 | Status: SHIPPED | OUTPATIENT
Start: 2019-01-17 | End: 2019-09-20

## 2019-01-17 NOTE — TELEPHONE ENCOUNTER
Called and spoke to patient verified that she did receive information regarding labs. She states there was a mix up with her Atorvastatin as they had she was taking 40 mg but was prescribed 20 mg tablets.   She states she did receive 30 pills recently and

## 2019-02-02 RX ORDER — LEVOTHYROXINE SODIUM 0.03 MG/1
TABLET ORAL
Qty: 90 TABLET | Refills: 3 | Status: SHIPPED | OUTPATIENT
Start: 2019-02-02 | End: 2020-01-28

## 2019-02-28 DIAGNOSIS — E78.00 HYPERCHOLESTEREMIA: ICD-10-CM

## 2019-02-28 RX ORDER — VENLAFAXINE HYDROCHLORIDE 75 MG/1
CAPSULE, EXTENDED RELEASE ORAL
Qty: 90 CAPSULE | Refills: 0 | Status: SHIPPED | OUTPATIENT
Start: 2019-02-28 | End: 2019-06-29

## 2019-02-28 RX ORDER — ATORVASTATIN CALCIUM 20 MG/1
TABLET, FILM COATED ORAL
Qty: 90 TABLET | Refills: 0 | OUTPATIENT
Start: 2019-02-28

## 2019-02-28 NOTE — TELEPHONE ENCOUNTER
Atorvastatin Last rf 1/17/19 #90x1-[facility] in [place]  Venlafaxine 12/21/18 #90x0  Last ov 1/9/19  Last labs 1/9/19    No future appointments. Atorvastatin denied as pt should have enough meds and a refill.

## 2019-05-10 ENCOUNTER — TELEPHONE (OUTPATIENT)
Dept: FAMILY MEDICINE CLINIC | Facility: CLINIC | Age: 59
End: 2019-05-10

## 2019-05-10 NOTE — TELEPHONE ENCOUNTER
Gricel Downs is overdue for her annual blood work. I talked with her. She is out of town but will be back next week. She is asking that we send the orders over to  and she will have them drawn there. She works there.

## 2019-05-10 NOTE — TELEPHONE ENCOUNTER
December labs were done at [facility] [place] in January.   Order for HgA1c faxed to [facility] per patient's request.

## 2019-05-22 DIAGNOSIS — G47.00 INSOMNIA, UNSPECIFIED TYPE: ICD-10-CM

## 2019-05-22 RX ORDER — ZOLPIDEM TARTRATE 10 MG/1
TABLET ORAL
Qty: 90 TABLET | Refills: 0 | Status: SHIPPED | OUTPATIENT
Start: 2019-05-22 | End: 2019-10-04

## 2019-06-01 ENCOUNTER — TELEPHONE (OUTPATIENT)
Dept: FAMILY MEDICINE CLINIC | Facility: CLINIC | Age: 59
End: 2019-06-01

## 2019-06-01 DIAGNOSIS — R73.03 PREDIABETES: Primary | ICD-10-CM

## 2019-06-29 RX ORDER — MONTELUKAST SODIUM 10 MG/1
TABLET ORAL
Qty: 90 TABLET | Refills: 0 | Status: SHIPPED | OUTPATIENT
Start: 2019-06-29 | End: 2019-12-23

## 2019-06-29 NOTE — TELEPHONE ENCOUNTER
LOV- 1-9-2019  Last refills:  Venlafaxine -2/28/2019 #90 with no refill - pending in encounter  Montelukast- 10/2/2018 #90 with 1 refill - refilled per protocol. No future appointments.

## 2019-07-01 RX ORDER — VENLAFAXINE HYDROCHLORIDE 75 MG/1
CAPSULE, EXTENDED RELEASE ORAL
Qty: 90 CAPSULE | Refills: 0 | Status: SHIPPED | OUTPATIENT
Start: 2019-07-01 | End: 2019-09-20

## 2019-09-03 ENCOUNTER — PATIENT MESSAGE (OUTPATIENT)
Dept: FAMILY MEDICINE CLINIC | Facility: CLINIC | Age: 59
End: 2019-09-03

## 2019-09-03 NOTE — TELEPHONE ENCOUNTER
From: Casey Appiah  To: Odalys Lees DO  Sent: 9/3/2019 9:19 AM CDT  Subject: Prescription Question    (Deborah Jones arrived 8/30 at 6:02am 6#16oz 19.5\"   Neopuff for 30 min and on oxygen in special care first 6 hours.)    Grecia spent all AM 9

## 2019-09-04 RX ORDER — PREDNISONE 20 MG/1
20 TABLET ORAL 2 TIMES DAILY
Qty: 10 TABLET | Refills: 0 | Status: SHIPPED | OUTPATIENT
Start: 2019-09-04 | End: 2019-09-09

## 2019-09-21 NOTE — TELEPHONE ENCOUNTER
Venlafaxine 7/1/19 #90x0  Atorvastatin 1/17/19 #90x1    Last ov 1/19/19  Last labs 1/9/19    No future appointments.

## 2019-09-24 RX ORDER — ATORVASTATIN CALCIUM 20 MG/1
TABLET, FILM COATED ORAL
Qty: 90 TABLET | Refills: 0 | Status: SHIPPED | OUTPATIENT
Start: 2019-09-24 | End: 2019-12-23

## 2019-09-24 RX ORDER — VENLAFAXINE HYDROCHLORIDE 75 MG/1
CAPSULE, EXTENDED RELEASE ORAL
Qty: 90 CAPSULE | Refills: 0 | Status: SHIPPED | OUTPATIENT
Start: 2019-09-24 | End: 2019-12-26

## 2019-09-30 DIAGNOSIS — J44.1 ASTHMA EXACERBATION IN COPD (HCC): ICD-10-CM

## 2019-09-30 DIAGNOSIS — J45.901 ASTHMA EXACERBATION IN COPD (HCC): ICD-10-CM

## 2019-09-30 RX ORDER — FLUTICASONE PROPIONATE AND SALMETEROL 250; 50 UG/1; UG/1
1 POWDER RESPIRATORY (INHALATION) 2 TIMES DAILY
Qty: 60 EACH | Refills: 0 | Status: SHIPPED | OUTPATIENT
Start: 2019-09-30 | End: 2020-03-28

## 2019-10-04 DIAGNOSIS — G47.00 INSOMNIA, UNSPECIFIED TYPE: ICD-10-CM

## 2019-10-04 RX ORDER — ZOLPIDEM TARTRATE 10 MG/1
TABLET ORAL
Qty: 90 TABLET | Refills: 0 | Status: SHIPPED | OUTPATIENT
Start: 2019-10-04 | End: 2020-01-29

## 2019-12-23 RX ORDER — ATORVASTATIN CALCIUM 20 MG/1
TABLET, FILM COATED ORAL
Qty: 90 TABLET | Refills: 0 | Status: SHIPPED | OUTPATIENT
Start: 2019-12-23 | End: 2020-02-27

## 2019-12-23 RX ORDER — MONTELUKAST SODIUM 10 MG/1
TABLET ORAL
Qty: 90 TABLET | Refills: 0 | Status: SHIPPED | OUTPATIENT
Start: 2019-12-23 | End: 2020-02-27

## 2019-12-23 NOTE — TELEPHONE ENCOUNTER
Last refill #90 on 9/24/19  Last office visit on 1/9/19  No future appointments.'  Last lipids in Jan 2019  Reminder letter sent to patient notifying her she is due for physical in January.

## 2019-12-26 RX ORDER — VENLAFAXINE HYDROCHLORIDE 75 MG/1
CAPSULE, EXTENDED RELEASE ORAL
Qty: 90 CAPSULE | Refills: 0 | Status: SHIPPED | OUTPATIENT
Start: 2019-12-26 | End: 2020-02-27

## 2019-12-30 ENCOUNTER — TELEPHONE (OUTPATIENT)
Dept: FAMILY MEDICINE CLINIC | Facility: CLINIC | Age: 59
End: 2019-12-30

## 2019-12-30 NOTE — TELEPHONE ENCOUNTER
Ally, your Mammogram normal. Repeat in 1 year. Continue monthly self breast exam. Follow up with any palpable or visible abnormality.

## 2020-01-28 RX ORDER — LEVOTHYROXINE SODIUM 0.03 MG/1
TABLET ORAL
Qty: 90 TABLET | Refills: 0 | Status: SHIPPED | OUTPATIENT
Start: 2020-01-28 | End: 2020-04-27

## 2020-01-28 NOTE — TELEPHONE ENCOUNTER
Last refill #90 x 3 on 2/2/19  Last office visit on 1/9/19 (cpx)  Future Appointments   Date Time Provider Tiff Camacho   2/18/2020  9:45 AM Meghan Armas, DO [facility] [place]     Refilled per protocol

## 2020-01-29 DIAGNOSIS — G47.00 INSOMNIA, UNSPECIFIED TYPE: ICD-10-CM

## 2020-01-29 RX ORDER — ZOLPIDEM TARTRATE 10 MG/1
TABLET ORAL
Qty: 90 TABLET | Refills: 0 | Status: SHIPPED | OUTPATIENT
Start: 2020-01-29 | End: 2020-03-23

## 2020-01-29 NOTE — TELEPHONE ENCOUNTER
LOV 1/9/19    LAST LAB  1/14/19    LAST RX 10/4/19 90 tabs 0 refills    Next OV   Future Appointments   Date Time Provider Tiff Camacho   2/18/2020  9:45 AM Shay Armas,  [facility]         PROTOCOL  none

## 2020-02-03 NOTE — PROGRESS NOTES
HPI:   Soha Mendoza is a 63year old female who presents for upper respiratory symptoms for  1  weeks. Patient reports congestion. Markie Akhtar is here for evaluation of her sinuses. Her asthma has been flaring up with her nasal congestion.  Did get flu s Depression    • Esophageal reflux    • Extrinsic asthma, unspecified    • Hypothyroidism 11/11/2014   • Other and unspecified hyperlipidemia       Past Surgical History:   Procedure Laterality Date   • ADENOIDECTOMY      age 16 with tonsillectomy   • APPEN non-recurrent maxillary sinusitis    No orders of the defined types were placed in this encounter.       Meds & Refills for this Visit:  Requested Prescriptions     Signed Prescriptions Disp Refills   • predniSONE 20 MG Oral Tab 14 tablet 0     Sig: Take 1

## 2020-02-04 ENCOUNTER — OFFICE VISIT (OUTPATIENT)
Dept: FAMILY MEDICINE CLINIC | Facility: CLINIC | Age: 60
End: 2020-02-04
Payer: COMMERCIAL

## 2020-02-04 ENCOUNTER — TELEPHONE (OUTPATIENT)
Dept: FAMILY MEDICINE CLINIC | Facility: CLINIC | Age: 60
End: 2020-02-04

## 2020-02-04 VITALS
HEART RATE: 64 BPM | SYSTOLIC BLOOD PRESSURE: 128 MMHG | DIASTOLIC BLOOD PRESSURE: 80 MMHG | RESPIRATION RATE: 16 BRPM | BODY MASS INDEX: 31 KG/M2 | TEMPERATURE: 98 F | WEIGHT: 161.13 LBS

## 2020-02-04 DIAGNOSIS — J98.01 BRONCHOSPASM, ACUTE: Primary | ICD-10-CM

## 2020-02-04 DIAGNOSIS — J01.00 ACUTE NON-RECURRENT MAXILLARY SINUSITIS: ICD-10-CM

## 2020-02-04 PROCEDURE — 99213 OFFICE O/P EST LOW 20 MIN: CPT | Performed by: FAMILY MEDICINE

## 2020-02-04 RX ORDER — PREDNISONE 20 MG/1
20 TABLET ORAL 2 TIMES DAILY
Qty: 14 TABLET | Refills: 0 | Status: SHIPPED | OUTPATIENT
Start: 2020-02-04 | End: 2020-02-11

## 2020-02-04 RX ORDER — AZITHROMYCIN 250 MG/1
TABLET, FILM COATED ORAL
Qty: 6 TABLET | Refills: 0 | Status: SHIPPED | OUTPATIENT
Start: 2020-02-04 | End: 2020-02-17 | Stop reason: ALTCHOICE

## 2020-02-04 NOTE — TELEPHONE ENCOUNTER
Informed Marlen Rojas, pharmacist per Dr. Mi Section patient is ok to take Zithromax.  She has had several times in the past.

## 2020-02-04 NOTE — PATIENT INSTRUCTIONS
zithromax  Prednisone 20 mg twice a day for 7 days  mucinex prn  Saline as needed.   flonase  Albuterol every 3- 4 hours and wean  contiue advair twice  A day

## 2020-02-18 ENCOUNTER — OFFICE VISIT (OUTPATIENT)
Dept: FAMILY MEDICINE CLINIC | Facility: CLINIC | Age: 60
End: 2020-02-18
Payer: COMMERCIAL

## 2020-02-18 VITALS
RESPIRATION RATE: 12 BRPM | SYSTOLIC BLOOD PRESSURE: 130 MMHG | DIASTOLIC BLOOD PRESSURE: 78 MMHG | HEIGHT: 61 IN | WEIGHT: 163.5 LBS | HEART RATE: 60 BPM | BODY MASS INDEX: 30.87 KG/M2 | TEMPERATURE: 98 F

## 2020-02-18 DIAGNOSIS — J30.89 NON-SEASONAL ALLERGIC RHINITIS DUE TO OTHER ALLERGIC TRIGGER: ICD-10-CM

## 2020-02-18 DIAGNOSIS — J44.1 ASTHMA EXACERBATION IN COPD (HCC): ICD-10-CM

## 2020-02-18 DIAGNOSIS — Z01.419 WELL WOMAN EXAM: ICD-10-CM

## 2020-02-18 DIAGNOSIS — J45.901 ASTHMA EXACERBATION IN COPD (HCC): ICD-10-CM

## 2020-02-18 DIAGNOSIS — R73.03 PREDIABETES: Primary | ICD-10-CM

## 2020-02-18 DIAGNOSIS — E78.00 HYPERCHOLESTEREMIA: ICD-10-CM

## 2020-02-18 DIAGNOSIS — E03.9 ACQUIRED HYPOTHYROIDISM: ICD-10-CM

## 2020-02-18 DIAGNOSIS — F90.0 ATTENTION DEFICIT HYPERACTIVITY DISORDER (ADHD), PREDOMINANTLY INATTENTIVE TYPE: ICD-10-CM

## 2020-02-18 PROCEDURE — 99396 PREV VISIT EST AGE 40-64: CPT | Performed by: FAMILY MEDICINE

## 2020-02-18 NOTE — H&P
HPI:   Kassandra Church is a 63year old female who presents for a complete physical exam. Symptoms: is menopausal.  Had ablation. Patient complains of needing physical. And labs. Is participating in a weight loss challenge at work.  Was successful with DISKUS) 250-50 MCG/DOSE Inhalation Aerosol Powder, Breath Activated Inhale 1 puff into the lungs 2 (two) times daily. 60 each 0   • aspirin 81 MG Oral Tab Take 81 mg by mouth daily.      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 Exercise: walking.   Diet: low carb diet     REVIEW OF SYSTEMS:   GENERAL: feels well otherwise  SKIN: denies any unusual skin darin current  - discussed shingrix vaccine - to check insurance coverage  - boosterix - will get at work  - walk 10,000 steps  - agree with [employer] and portion control.     2. Prediabetes  - weigth loss  - dietary changes  - HEMOGLOBIN A1C; Future

## 2020-02-18 NOTE — H&P
[facility], \[facility] [place] [facility]\""    History and Physical    Vickey Robledo Patient Status:  No patient class for patient encounter    1960 MRN [id number]   Location  [place] [address] Attending No [Meclofena*      (Not in a hospital admission)      Review of Systems:   Review of Systems    Physical Exam:   Vital Signs:  Blood pressure 130/78, pulse 60, temperature 97.8 °F (36.6 °C), temperature source Temporal, resp.  rate 12, height 61\", weight 163

## 2020-02-24 ENCOUNTER — TELEPHONE (OUTPATIENT)
Dept: FAMILY MEDICINE CLINIC | Facility: CLINIC | Age: 60
End: 2020-02-24

## 2020-02-27 RX ORDER — ATORVASTATIN CALCIUM 20 MG/1
TABLET, FILM COATED ORAL
Qty: 90 TABLET | Refills: 0 | Status: SHIPPED | OUTPATIENT
Start: 2020-02-27 | End: 2020-08-19

## 2020-02-27 RX ORDER — VENLAFAXINE HYDROCHLORIDE 75 MG/1
CAPSULE, EXTENDED RELEASE ORAL
Qty: 90 CAPSULE | Refills: 0 | Status: SHIPPED | OUTPATIENT
Start: 2020-02-27 | End: 2020-06-11

## 2020-02-27 RX ORDER — MONTELUKAST SODIUM 10 MG/1
TABLET ORAL
Qty: 90 TABLET | Refills: 0 | Status: SHIPPED | OUTPATIENT
Start: 2020-02-27 | End: 2020-06-11

## 2020-02-27 NOTE — TELEPHONE ENCOUNTER
Last refill on all meds #90 on 12/23/19  Last office visit on 2/18/2020  No future appointments.   Patient is due for blood work

## 2020-02-28 ENCOUNTER — TELEPHONE (OUTPATIENT)
Dept: FAMILY MEDICINE CLINIC | Facility: CLINIC | Age: 60
End: 2020-02-28

## 2020-02-28 DIAGNOSIS — E03.9 ACQUIRED HYPOTHYROIDISM: Primary | ICD-10-CM

## 2020-02-28 DIAGNOSIS — E78.00 HYPERCHOLESTEREMIA: ICD-10-CM

## 2020-02-28 DIAGNOSIS — R73.03 PREDIABETES: ICD-10-CM

## 2020-02-29 NOTE — TELEPHONE ENCOUNTER
Patient advised, she said that she was on steroids a couple weeks ago an it always \"makes her levels go up\". She is asking if she can hold off on the Metformin for now and have rechecked.

## 2020-02-29 NOTE — TELEPHONE ENCOUNTER
Ailyn your labs all look great. Comp is normal, your thyroid is good. Continue your current dose of levothyroxine 25 mcg and recheck in 1 year. Your lipids are good contnue  Atorvastatin 20 mg and recheck lipids in 6 months    Your HBA1C is 5.9.   You

## 2020-03-03 NOTE — TELEPHONE ENCOUNTER
Dr Joanne Esparza states that it is ok to wait recheck in 3 months. V.O. Dr Severo Fruit RN Attempted to call patient, Deondre Baca is full.

## 2020-03-16 RX ORDER — LEVALBUTEROL INHALATION SOLUTION 0.63 MG/3ML
0.63 SOLUTION RESPIRATORY (INHALATION) EVERY 6 HOURS PRN
Qty: 72 ML | Refills: 0 | Status: SHIPPED | OUTPATIENT
Start: 2020-03-16

## 2020-03-16 RX ORDER — LEVALBUTEROL INHALATION SOLUTION 0.63 MG/3ML
SOLUTION RESPIRATORY (INHALATION)
Refills: 0 | OUTPATIENT
Start: 2020-03-16

## 2020-03-22 DIAGNOSIS — G47.00 INSOMNIA, UNSPECIFIED TYPE: ICD-10-CM

## 2020-03-23 RX ORDER — ZOLPIDEM TARTRATE 10 MG/1
TABLET ORAL
Qty: 90 TABLET | Refills: 0 | Status: SHIPPED | OUTPATIENT
Start: 2020-03-23 | End: 2020-09-11

## 2020-03-28 DIAGNOSIS — J44.1 ASTHMA EXACERBATION IN COPD (HCC): ICD-10-CM

## 2020-03-28 DIAGNOSIS — J45.901 ASTHMA EXACERBATION IN COPD (HCC): ICD-10-CM

## 2020-03-28 DIAGNOSIS — J45.20 ASTHMA, STABLE, MILD INTERMITTENT: ICD-10-CM

## 2020-03-28 NOTE — TELEPHONE ENCOUNTER
LOV  02/18/2020    LAST LAB  05/17/2018    Next OV  No future appointments. PROTOCOL      Albuterol Sulfate  (90 Base) MCG/ACT Inhalation Aero Soln               Sig: Inhale 2 puffs into the lungs every 6 (six) hours as needed for Wheezing.     Serena Valiente

## 2020-03-30 RX ORDER — ALBUTEROL SULFATE 90 UG/1
2 AEROSOL, METERED RESPIRATORY (INHALATION) EVERY 6 HOURS PRN
Qty: 3 INHALER | Refills: 3 | Status: SHIPPED | OUTPATIENT
Start: 2020-03-30 | End: 2021-12-31

## 2020-03-30 RX ORDER — FLUTICASONE PROPIONATE AND SALMETEROL 250; 50 UG/1; UG/1
1 POWDER RESPIRATORY (INHALATION) 2 TIMES DAILY
Qty: 60 EACH | Refills: 0 | Status: SHIPPED | OUTPATIENT
Start: 2020-03-30 | End: 2020-06-11

## 2020-04-27 RX ORDER — LEVOTHYROXINE SODIUM 0.03 MG/1
TABLET ORAL
Qty: 90 TABLET | Refills: 2 | Status: SHIPPED | OUTPATIENT
Start: 2020-04-27 | End: 2020-09-28 | Stop reason: ALTCHOICE

## 2020-04-27 NOTE — TELEPHONE ENCOUNTER
Last refill # 90 on 1/28/2020  Last office visit on 2/18/2020  No future appointments.   Labs current until 2/2021  Refilled per protocol

## 2020-05-20 NOTE — LETTER
ASTHMA ACTION PLAN for Argenis Sibley     : 1960     Date: 2/3/2021  Provider:  Nora Cline DO  Phone for doctor or clinic: [address] [place] [address], [address]  [facility] [id number] [phone] a none

## 2020-06-05 ENCOUNTER — TELEPHONE (OUTPATIENT)
Dept: FAMILY MEDICINE CLINIC | Facility: CLINIC | Age: 60
End: 2020-06-05

## 2020-06-05 NOTE — TELEPHONE ENCOUNTER
Just got covid antibiody test result from [facility] . You do not have covid antibodies. IgG was tested.

## 2020-06-05 NOTE — TELEPHONE ENCOUNTER
JESSICA WAS EXPOSED TO COVID AND SHE WENT AND TESTED AT [facility].  SHE IS WONDERING IF THE NURSE HAS RECEIVED THEM? SHE WOULD LIKE THE TEST RESULTS.

## 2020-06-11 DIAGNOSIS — J45.901 ASTHMA EXACERBATION IN COPD (HCC): ICD-10-CM

## 2020-06-11 DIAGNOSIS — J44.1 ASTHMA EXACERBATION IN COPD (HCC): ICD-10-CM

## 2020-06-11 RX ORDER — VENLAFAXINE HYDROCHLORIDE 75 MG/1
CAPSULE, EXTENDED RELEASE ORAL
Qty: 90 CAPSULE | Refills: 0 | Status: SHIPPED | OUTPATIENT
Start: 2020-06-11 | End: 2020-09-08

## 2020-06-11 RX ORDER — MONTELUKAST SODIUM 10 MG/1
TABLET ORAL
Qty: 90 TABLET | Refills: 0 | Status: SHIPPED | OUTPATIENT
Start: 2020-06-11 | End: 2020-09-08

## 2020-06-11 RX ORDER — FLUTICASONE PROPIONATE AND SALMETEROL 50; 250 UG/1; UG/1
POWDER RESPIRATORY (INHALATION)
Qty: 60 EACH | Refills: 0 | Status: SHIPPED | OUTPATIENT
Start: 2020-06-11 | End: 2020-07-08

## 2020-06-11 NOTE — TELEPHONE ENCOUNTER
ADVAIR  Asthma & COPD Medication Protocol Failed6/11 10:12 AM   Asthma Action Score greater than or equal to 20    AAP/ACT given in last 12 months    Appointment in past 6 or next 3 months      SINGULAR  Asthma & COPD Medication Protocol Failed6/11 10:12 A

## 2020-06-16 ENCOUNTER — PATIENT MESSAGE (OUTPATIENT)
Dept: FAMILY MEDICINE CLINIC | Facility: CLINIC | Age: 60
End: 2020-06-16

## 2020-06-16 DIAGNOSIS — E03.9 ACQUIRED HYPOTHYROIDISM: Primary | ICD-10-CM

## 2020-06-16 NOTE — TELEPHONE ENCOUNTER
From: Frieda Nash  To: Flori Lopez DO  Sent: 6/16/2020 11:54 AM CDT  Subject: Non-Urgent Medical Question    Been having mild PVC's for a while. Not real strong but noticeable. And can record them on my Apple Watch.  (See attachment if able)   Al

## 2020-06-18 ENCOUNTER — TELEPHONE (OUTPATIENT)
Dept: FAMILY MEDICINE CLINIC | Facility: CLINIC | Age: 60
End: 2020-06-18

## 2020-06-18 DIAGNOSIS — E03.9 ACQUIRED HYPOTHYROIDISM: Primary | ICD-10-CM

## 2020-06-18 NOTE — TELEPHONE ENCOUNTER
HAD THYROID LABS DONE, TSH CAME BACK ELEV, NOT SURE IF  WANTS TO CHANGE MEDS, PT HAS BEEN HAVING PVC PALPITATIONS, SHE IS ON LEVOTHYROXINE 25MCG

## 2020-06-18 NOTE — TELEPHONE ENCOUNTER
FAX LAB ORDERS TO [id number] [facility] SO PT CAN GET LABS DONE, SHE DID NOT HAVE FAX #

## 2020-06-18 NOTE — TELEPHONE ENCOUNTER
Patient advised lab order for TSH and Free T4 faxed to [facility].

## 2020-06-18 NOTE — TELEPHONE ENCOUNTER
Patient said that she has been having palpitations for over 1 month and has been taking Levothyroxine 25mcg. She had her Labs done at [facility] today.

## 2020-06-19 RX ORDER — LEVOTHYROXINE SODIUM 0.05 MG/1
50 TABLET ORAL
Qty: 90 TABLET | Refills: 0 | Status: SHIPPED | OUTPATIENT
Start: 2020-06-19 | End: 2020-08-18

## 2020-06-19 NOTE — TELEPHONE ENCOUNTER
Patient aware of TSH result and I informed her that medication was sent to pharmacy and to recheck TSH in 8 weeks. Faxed order for lab to [facility].

## 2020-07-08 DIAGNOSIS — J44.1 ASTHMA EXACERBATION IN COPD (HCC): ICD-10-CM

## 2020-07-08 DIAGNOSIS — J45.901 ASTHMA EXACERBATION IN COPD (HCC): ICD-10-CM

## 2020-07-08 RX ORDER — FLUTICASONE PROPIONATE AND SALMETEROL 50; 250 UG/1; UG/1
POWDER RESPIRATORY (INHALATION)
Qty: 60 EACH | Refills: 0 | Status: SHIPPED | OUTPATIENT
Start: 2020-07-08

## 2020-07-08 NOTE — TELEPHONE ENCOUNTER
LOV:  2/18/2020     LAB:    2/28/20    LRX:    ADVAIR DISKUS 250-50 MCG/DOSE Inhalation Aerosol Powder, Breath Activated 60 each 0 refill 6/11/2020    NOV:     No future appointments.     PROTOCOL:    Asthma & COPD Medication Protocol Failed7/8 12:53 PM   A

## 2020-08-18 DIAGNOSIS — E03.9 ACQUIRED HYPOTHYROIDISM: ICD-10-CM

## 2020-08-18 RX ORDER — LEVOTHYROXINE SODIUM 0.05 MG/1
TABLET ORAL
Qty: 90 TABLET | Refills: 0 | Status: SHIPPED | OUTPATIENT
Start: 2020-08-18 | End: 2020-10-21

## 2020-08-18 NOTE — TELEPHONE ENCOUNTER
Spoke with patient and advised having thyroid labs done. Per patient request will fax order to [place]. LOV 2/18/20    LAST LAB  6/18/20    LAST RX  6/19/20 90 tabs 0 refill    Next OV No future appointments.       PROTOCOL    Thyroid Supplements Protocol

## 2020-08-19 RX ORDER — ATORVASTATIN CALCIUM 20 MG/1
TABLET, FILM COATED ORAL
Qty: 90 TABLET | Refills: 0 | Status: SHIPPED | OUTPATIENT
Start: 2020-08-19 | End: 2020-11-17

## 2020-08-19 NOTE — TELEPHONE ENCOUNTER
LOV  2/18/20    LAST LAB 2/29/20    LAST RX 2/27/20 90 tabs 0 refills    Next OV  No future appointments.       PROTOCOL  Cholesterol Medication Protocol Failed8/19 3:29 AM   ALT < 80    ALT resulted within past year    Lipid panel within past 12 months

## 2020-09-08 RX ORDER — VENLAFAXINE HYDROCHLORIDE 75 MG/1
CAPSULE, EXTENDED RELEASE ORAL
Qty: 90 CAPSULE | Refills: 0 | Status: SHIPPED | OUTPATIENT
Start: 2020-09-08 | End: 2020-12-04

## 2020-09-08 RX ORDER — MONTELUKAST SODIUM 10 MG/1
TABLET ORAL
Qty: 90 TABLET | Refills: 0 | Status: SHIPPED | OUTPATIENT
Start: 2020-09-08 | End: 2020-12-04

## 2020-09-08 NOTE — TELEPHONE ENCOUNTER
LOV  2/18/20    LAST LAB 6/18/20  LAST RX  Montelukast 6/11/20 90 tabs                  Venlafaxine  6/11/20 90 caps 0 refills  Next OV  No future appointments.       PROTOCOL  Asthma & COPD Medication Protocol Failed9/6 6:02 AM   Asthma Action Score mayank

## 2020-09-10 DIAGNOSIS — G47.00 INSOMNIA, UNSPECIFIED TYPE: ICD-10-CM

## 2020-09-11 ENCOUNTER — TELEPHONE (OUTPATIENT)
Dept: FAMILY MEDICINE CLINIC | Facility: CLINIC | Age: 60
End: 2020-09-11

## 2020-09-11 LAB — AMB EXT TSH: 0.65 MIU/ML

## 2020-09-11 RX ORDER — ZOLPIDEM TARTRATE 10 MG/1
TABLET ORAL
Qty: 90 TABLET | Refills: 0 | Status: SHIPPED | OUTPATIENT
Start: 2020-09-11 | End: 2020-12-04

## 2020-09-28 ENCOUNTER — TELEMEDICINE (OUTPATIENT)
Dept: FAMILY MEDICINE CLINIC | Facility: CLINIC | Age: 60
End: 2020-09-28
Payer: COMMERCIAL

## 2020-09-28 VITALS — WEIGHT: 158 LBS | BODY MASS INDEX: 30 KG/M2 | TEMPERATURE: 98 F

## 2020-09-28 DIAGNOSIS — R05.9 COUGH: ICD-10-CM

## 2020-09-28 DIAGNOSIS — R52 BODY ACHES: ICD-10-CM

## 2020-09-28 DIAGNOSIS — Z20.822 SUSPECTED COVID-19 VIRUS INFECTION: Primary | ICD-10-CM

## 2020-09-28 DIAGNOSIS — Z20.822 EXPOSURE TO COVID-19 VIRUS: ICD-10-CM

## 2020-09-28 PROCEDURE — 99213 OFFICE O/P EST LOW 20 MIN: CPT | Performed by: FAMILY MEDICINE

## 2020-09-28 NOTE — PROGRESS NOTES
Virtual/Telephone Check-In    Emma Flores Charity  verbally consents to a Virtual/Telephone Check-In service on 9/28/2020 .   Patient understands and accepts financial responsibility for any deductible, co-insurance and/or co-pays associated with this servi each 0   • Levalbuterol HCl 0.63 MG/3ML Inhalation Nebu Soln Take 3 mL (0.63 mg total) by nebulization every 6 (six) hours as needed for Wheezing. 72 mL 0   • aspirin 81 MG Oral Tab Take 81 mg by mouth daily.      • Olopatadine HCl (PATADAY) 0.2 % Ophthalmi complaints of issues  LUNGS: denies shortness of breath with exertion  But persistent non productive cough  CARDIOVASCULAR: denies chest pain on exertion  GI: no nausea or abdominal pain  NEURO: denies headaches      EXAM:   VITALS: not available as this i

## 2020-09-29 ENCOUNTER — APPOINTMENT (OUTPATIENT)
Dept: LAB | Age: 60
End: 2020-09-29
Attending: FAMILY MEDICINE
Payer: COMMERCIAL

## 2020-09-29 DIAGNOSIS — R52 BODY ACHES: ICD-10-CM

## 2020-09-29 DIAGNOSIS — R05.9 COUGH: ICD-10-CM

## 2020-09-29 DIAGNOSIS — Z20.822 SUSPECTED COVID-19 VIRUS INFECTION: ICD-10-CM

## 2020-09-29 DIAGNOSIS — Z20.822 EXPOSURE TO COVID-19 VIRUS: ICD-10-CM

## 2020-10-21 DIAGNOSIS — E03.9 ACQUIRED HYPOTHYROIDISM: ICD-10-CM

## 2020-10-22 RX ORDER — LEVOTHYROXINE SODIUM 0.05 MG/1
50 TABLET ORAL
Qty: 90 TABLET | Refills: 3 | Status: SHIPPED | OUTPATIENT
Start: 2020-10-22 | End: 2021-12-02

## 2020-10-22 NOTE — TELEPHONE ENCOUNTER
Last refill #90 on 8/18/2020  Last office visit on 9/28/2020 -  Virtual  No future appointments.   Labs current  Refilled per protocol

## 2020-11-15 DIAGNOSIS — E78.00 HYPERCHOLESTEREMIA: Primary | ICD-10-CM

## 2020-11-17 RX ORDER — ATORVASTATIN CALCIUM 20 MG/1
TABLET, FILM COATED ORAL
Qty: 90 TABLET | Refills: 0 | Status: SHIPPED | OUTPATIENT
Start: 2020-11-17 | End: 2021-02-22

## 2020-11-17 NOTE — TELEPHONE ENCOUNTER
Last refill #90 on 8/19/2020  Last office visit on 2/18/2020  No future appointments. Patient is due for lipids and cmp. Reminder letter sent.

## 2020-12-03 DIAGNOSIS — G47.00 INSOMNIA, UNSPECIFIED TYPE: ICD-10-CM

## 2020-12-04 RX ORDER — MONTELUKAST SODIUM 10 MG/1
TABLET ORAL
Qty: 90 TABLET | Refills: 0 | Status: SHIPPED | OUTPATIENT
Start: 2020-12-04 | End: 2021-02-22

## 2020-12-04 RX ORDER — ZOLPIDEM TARTRATE 10 MG/1
TABLET ORAL
Qty: 90 TABLET | Refills: 0 | Status: SHIPPED | OUTPATIENT
Start: 2020-12-04 | End: 2021-04-21

## 2020-12-04 RX ORDER — VENLAFAXINE HYDROCHLORIDE 75 MG/1
CAPSULE, EXTENDED RELEASE ORAL
Qty: 90 CAPSULE | Refills: 0 | Status: SHIPPED | OUTPATIENT
Start: 2020-12-04 | End: 2021-02-22

## 2020-12-04 NOTE — TELEPHONE ENCOUNTER
Last refill on zolpidem #90 on 9/11/2020  Last refill on venlafaxine #90 on 9/8/2020  Last refill on montelukast #90 on 9/8/2020  Last office visit on 2/18/2020  No future appointments.   Patient was due for annual physical on or around 8/18/2020 as well as

## 2021-01-15 DIAGNOSIS — E03.9 ACQUIRED HYPOTHYROIDISM: ICD-10-CM

## 2021-01-15 RX ORDER — LEVOTHYROXINE SODIUM 0.05 MG/1
TABLET ORAL
Qty: 90 TABLET | Refills: 3 | OUTPATIENT
Start: 2021-01-15

## 2021-01-15 NOTE — TELEPHONE ENCOUNTER
LOV 09/28/2020-telemedicine    LAST LAB 09/11/2020    LAST RX  Levothyroxine #90 R3 10/22/2020    Next OV   Future Appointments   Date Time Provider Tiff Camacho   2/19/2021  1:00 PM Shay Armas,  [facility] [place]     PROTOCOL

## 2021-01-27 ENCOUNTER — TELEPHONE (OUTPATIENT)
Dept: FAMILY MEDICINE CLINIC | Facility: CLINIC | Age: 61
End: 2021-01-27

## 2021-01-27 NOTE — TELEPHONE ENCOUNTER
Pl send any orders for bloodwork that Cj Sample wants to [address]. Pt is going tomorrow or Friday to have it drawn.

## 2021-01-29 ENCOUNTER — TELEPHONE (OUTPATIENT)
Dept: FAMILY MEDICINE CLINIC | Facility: CLINIC | Age: 61
End: 2021-01-29

## 2021-01-29 DIAGNOSIS — E03.9 ACQUIRED HYPOTHYROIDISM: Primary | ICD-10-CM

## 2021-01-29 LAB
AMB EXT CHOL/HDL RATIO: 5
AMB EXT CHOLESTEROL, TOTAL: 212 MG/DL
AMB EXT CREATININE: 0.64 MG/DL
AMB EXT GLUCOSE: 114 MG/DL
AMB EXT HDL CHOLESTEROL: 47 MG/DL
AMB EXT HEMATOCRIT: 41.2
AMB EXT HEMOGLOBIN: 14.1
AMB EXT HGBA1C: 5.6 %
AMB EXT LDL CHOLESTEROL, DIRECT: 128 MG/DL
AMB EXT MCV: 87.3
AMB EXT PLATELETS: 309
AMB EXT TRIGLYCERIDES: 196 MG/DL
AMB EXT WBC: 5.3 X10(3)UL

## 2021-02-03 ENCOUNTER — TELEPHONE (OUTPATIENT)
Dept: FAMILY MEDICINE CLINIC | Facility: CLINIC | Age: 61
End: 2021-02-03

## 2021-02-03 ENCOUNTER — OFFICE VISIT (OUTPATIENT)
Dept: FAMILY MEDICINE CLINIC | Facility: CLINIC | Age: 61
End: 2021-02-03
Payer: COMMERCIAL

## 2021-02-03 ENCOUNTER — MED REC SCAN ONLY (OUTPATIENT)
Dept: FAMILY MEDICINE CLINIC | Facility: CLINIC | Age: 61
End: 2021-02-03

## 2021-02-03 VITALS
BODY MASS INDEX: 31.46 KG/M2 | RESPIRATION RATE: 12 BRPM | HEART RATE: 64 BPM | HEIGHT: 61 IN | WEIGHT: 166.63 LBS | TEMPERATURE: 98 F | DIASTOLIC BLOOD PRESSURE: 70 MMHG | SYSTOLIC BLOOD PRESSURE: 140 MMHG

## 2021-02-03 DIAGNOSIS — R73.03 PREDIABETES: ICD-10-CM

## 2021-02-03 DIAGNOSIS — E78.00 HYPERCHOLESTEREMIA: ICD-10-CM

## 2021-02-03 DIAGNOSIS — J45.20 ASTHMA, STABLE, MILD INTERMITTENT: ICD-10-CM

## 2021-02-03 DIAGNOSIS — I51.81 TAKOTSUBO CARDIOMYOPATHY: ICD-10-CM

## 2021-02-03 DIAGNOSIS — Z01.818 PREOP GENERAL PHYSICAL EXAM: Primary | ICD-10-CM

## 2021-02-03 DIAGNOSIS — Z12.31 BREAST CANCER SCREENING BY MAMMOGRAM: ICD-10-CM

## 2021-02-03 DIAGNOSIS — E03.9 ACQUIRED HYPOTHYROIDISM: ICD-10-CM

## 2021-02-03 DIAGNOSIS — Z23 NEED FOR VACCINATION: ICD-10-CM

## 2021-02-03 DIAGNOSIS — L98.7 EXCESSIVE AND REDUNDANT SKIN AND SUBCUTANEOUS TISSUE: ICD-10-CM

## 2021-02-03 PROCEDURE — 3077F SYST BP >= 140 MM HG: CPT | Performed by: FAMILY MEDICINE

## 2021-02-03 PROCEDURE — 99214 OFFICE O/P EST MOD 30 MIN: CPT | Performed by: FAMILY MEDICINE

## 2021-02-03 PROCEDURE — 3008F BODY MASS INDEX DOCD: CPT | Performed by: FAMILY MEDICINE

## 2021-02-03 PROCEDURE — 90732 PPSV23 VACC 2 YRS+ SUBQ/IM: CPT | Performed by: FAMILY MEDICINE

## 2021-02-03 PROCEDURE — 90471 IMMUNIZATION ADMIN: CPT | Performed by: FAMILY MEDICINE

## 2021-02-03 PROCEDURE — 3078F DIAST BP <80 MM HG: CPT | Performed by: FAMILY MEDICINE

## 2021-02-03 NOTE — PROGRESS NOTES
Vickey Robledo is a 62year old female who presents for a pre-operative physical exam. Patient is to have abdominoplasty with liposuction, to be done by Dr. Kurt Pizano  on 2/23/2021.   Did get modrena    HPI:   Pt complains of needing pre op physical Comment:FACIAL SWELLING  Meclomen [Meclofena*       Past Medical History:   Diagnosis Date   • ADHD (attention deficit hyperactivity disorder)    • Chronic rhinitis    • Depression    • Esophageal reflux    • Extrinsic asthma, unspecified    • Hypothyroi ENDOCRINE: denies thyroid history  ALL/ASTHMA: denies hx of allergy or asthma    EXAM:   /70   Pulse 64   Temp 98.3 °F (36.8 °C) (Temporal)   Resp 12   Ht 5' 1\" (1.549 m)   Wt 166 lb 9.6 oz (75.6 kg)   BMI 31.48 kg/m²   GENERAL: well developed, well Ventricular Rate 67 BPM   Atrial Rate 67 BPM   P-R Interval 100 ms   QRS Duration 92 ms    ms   QTc 395 ms   P Axis 21 degrees   R Axis -7 degrees   T Axis 21 degrees   Result Narrative   Sinus rhythm with short HI  Nonspecific T wave abnormality  Ab

## 2021-02-03 NOTE — PATIENT INSTRUCTIONS
Abdominoplasty (Tummy Tuck)  Abdominoplasty is often called a \"tummy tuck. \" During this procedure, extra fat and skin can be removed from your stomach or belly (abdomen). And certain muscles can be repositioned to help with abdominal weakness.  Keep in · Your surgeon will make an incision in the belly from hipbone to hipbone. This is often along the lower part of the belly just above the pubic hairline. You and your surgeon will choose the exact incision site prior to surgery.  An incision is also made ar · Walk slightly bent at the waist, if suggested by your healthcare provider. This helps protect the abdominal wall as it heals. · Care for your incisions and the bandage over them as instructed by your healthcare provider.   · Don’t shower for 72 hours aft · Symptoms of infection at an incision site such as increased redness or swelling, warmth, worsening pain, or foul-smelling drainage  · Pain that is not relieved by medicine  · Nothing comes out of your drains for more than 6 hours  · Pain, swelling, redne

## 2021-02-03 NOTE — TELEPHONE ENCOUNTER
Faxed office visit notes, EKG and lab results to [facility] at [phone]

## 2021-02-08 ENCOUNTER — TELEPHONE (OUTPATIENT)
Dept: FAMILY MEDICINE CLINIC | Facility: CLINIC | Age: 61
End: 2021-02-08

## 2021-02-08 NOTE — TELEPHONE ENCOUNTER
Patient had her pre-op appointment with Dr Mikael Choi on 02/03/2021. They have not received anything on her yet. Please fax the medical clearance to fax# [phone].

## 2021-02-10 ENCOUNTER — TELEPHONE (OUTPATIENT)
Dept: FAMILY MEDICINE CLINIC | Facility: CLINIC | Age: 61
End: 2021-02-10

## 2021-02-10 NOTE — TELEPHONE ENCOUNTER
Spoke with Mandy Amaral at Dr. Edna Anton office and let her know that patient's mammogram was done at [facility] and we do not have a hard copy as yet. Explained that it is resulted in [place] but I do not have a copy to fax.

## 2021-02-17 ENCOUNTER — TELEPHONE (OUTPATIENT)
Dept: FAMILY MEDICINE CLINIC | Facility: CLINIC | Age: 61
End: 2021-02-17

## 2021-02-17 DIAGNOSIS — Z01.812 PRE-PROCEDURE LAB EXAM: Primary | ICD-10-CM

## 2021-02-17 NOTE — TELEPHONE ENCOUNTER
PATIENT IS HAVING SURGERY ON FRIDAY THE 26TH AND WOULD LIKE TO GET A COVID TEST ORDER SENT TO [facility] TO BE DONE ON TUESDAY   DOES NOT WANT TO GO TO [place] TO HAVE IT DONE

## 2021-02-20 DIAGNOSIS — E78.00 HYPERCHOLESTEREMIA: ICD-10-CM

## 2021-02-22 RX ORDER — VENLAFAXINE HYDROCHLORIDE 75 MG/1
CAPSULE, EXTENDED RELEASE ORAL
Qty: 90 CAPSULE | Refills: 0 | Status: SHIPPED | OUTPATIENT
Start: 2021-02-22 | End: 2021-04-21

## 2021-02-22 RX ORDER — ATORVASTATIN CALCIUM 20 MG/1
TABLET, FILM COATED ORAL
Qty: 90 TABLET | Refills: 0 | Status: SHIPPED | OUTPATIENT
Start: 2021-02-22 | End: 2021-05-20

## 2021-02-22 RX ORDER — MONTELUKAST SODIUM 10 MG/1
TABLET ORAL
Qty: 90 TABLET | Refills: 0 | Status: SHIPPED | OUTPATIENT
Start: 2021-02-22 | End: 2021-06-21

## 2021-02-22 NOTE — TELEPHONE ENCOUNTER
Last refill #90 on 11/17/2020  Last office visit pertaining to refill on 2/3/2020  No future appointments.   Lipids current   Refilled per protocol

## 2021-02-22 NOTE — TELEPHONE ENCOUNTER
Last refill on both medications #90 on 12/4/2020    Last office visit pertaining to refill on 2/3/2021  No future appointments.

## 2021-03-18 DIAGNOSIS — Z23 NEED FOR VACCINATION: ICD-10-CM

## 2021-04-21 DIAGNOSIS — G47.00 INSOMNIA, UNSPECIFIED TYPE: ICD-10-CM

## 2021-04-21 RX ORDER — MONTELUKAST SODIUM 10 MG/1
TABLET ORAL
Qty: 90 TABLET | Refills: 0 | OUTPATIENT
Start: 2021-04-21

## 2021-04-21 RX ORDER — VENLAFAXINE HYDROCHLORIDE 75 MG/1
CAPSULE, EXTENDED RELEASE ORAL
Qty: 90 CAPSULE | Refills: 0 | Status: SHIPPED | OUTPATIENT
Start: 2021-04-21 | End: 2021-10-18

## 2021-04-21 RX ORDER — ZOLPIDEM TARTRATE 10 MG/1
TABLET ORAL
Qty: 90 TABLET | Refills: 0 | Status: SHIPPED | OUTPATIENT
Start: 2021-04-21 | End: 2021-08-27

## 2021-04-21 NOTE — TELEPHONE ENCOUNTER
LOV  2/3/21    LAST LAB    LAST RX  Zolpidem  12/4/20 90 days  0 refills                  Montelukast 2/22/21 90 days 0 refills                  Venlafaxine  2/22/21 90 days 0 refills    Next OV  No future appointments.       PROTOCOL  Asthma & COPD Medicat

## 2021-05-20 DIAGNOSIS — E78.00 HYPERCHOLESTEREMIA: ICD-10-CM

## 2021-05-20 RX ORDER — ATORVASTATIN CALCIUM 20 MG/1
TABLET, FILM COATED ORAL
Qty: 90 TABLET | Refills: 1 | Status: SHIPPED | OUTPATIENT
Start: 2021-05-20 | End: 2021-10-18

## 2021-05-20 NOTE — TELEPHONE ENCOUNTER
Last refill #90 on 2/22/2021  Last office visit pertaining to refill on 2/3/2021  No future appointments.   Labs current   Refilled per protocol

## 2021-06-22 RX ORDER — MONTELUKAST SODIUM 10 MG/1
10 TABLET ORAL NIGHTLY
Qty: 90 TABLET | Refills: 0 | Status: SHIPPED | OUTPATIENT
Start: 2021-06-22 | End: 2021-08-05

## 2021-06-22 NOTE — TELEPHONE ENCOUNTER
Last refill: 2/22/21 #90 w/ 0 refills  Last OV: 2/12/21  Last labs: 1/29/21    No future appointments.     Asthma & COPD Medication Protocol [id number]/21/2021 09:49 PM   Asthma Action Score greater than or equal to 20    Appointment in past 6 or next 3 month
yes

## 2021-08-05 RX ORDER — MONTELUKAST SODIUM 10 MG/1
TABLET ORAL
Qty: 90 TABLET | Refills: 0 | Status: SHIPPED | OUTPATIENT
Start: 2021-08-05 | End: 2021-11-17

## 2021-08-05 NOTE — TELEPHONE ENCOUNTER
Last refill #90 on 6/22/2021  Last office visit pertaining to refill on 2/3/2021  No future appointments.

## 2021-08-26 DIAGNOSIS — G47.00 INSOMNIA, UNSPECIFIED TYPE: ICD-10-CM

## 2021-08-27 RX ORDER — ZOLPIDEM TARTRATE 10 MG/1
TABLET ORAL
Qty: 90 TABLET | Refills: 0 | Status: SHIPPED | OUTPATIENT
Start: 2021-08-27 | End: 2021-11-29

## 2021-08-27 NOTE — TELEPHONE ENCOUNTER
Requested Prescriptions     Pending Prescriptions Disp Refills   • ZOLPIDEM 10 MG Oral Tab [Pharmacy Med Name: ZOLPIDEM 10MG TABLETS] 90 tablet 0     Sig: TAKE 1 TABLET BY MOUTH EVERY NIGHT     Last office visit pertaining to refill on 2/3/2021  No future

## 2021-10-18 DIAGNOSIS — E78.00 HYPERCHOLESTEREMIA: ICD-10-CM

## 2021-10-18 RX ORDER — VENLAFAXINE HYDROCHLORIDE 75 MG/1
CAPSULE, EXTENDED RELEASE ORAL
Qty: 90 CAPSULE | Refills: 0 | Status: SHIPPED | OUTPATIENT
Start: 2021-10-18 | End: 2022-01-10

## 2021-10-18 RX ORDER — ATORVASTATIN CALCIUM 20 MG/1
TABLET, FILM COATED ORAL
Qty: 90 TABLET | Refills: 1 | Status: SHIPPED | OUTPATIENT
Start: 2021-10-18

## 2021-10-18 NOTE — TELEPHONE ENCOUNTER
Requested Prescriptions     Pending Prescriptions Disp Refills   • VENLAFAXINE 75 MG Oral Capsule SR 24 Hr [Pharmacy Med Name: VENLAFAXINE ER 75MG CAPSULES] 90 capsule 0     Sig: TAKE 1 CAPSULE(75 MG) BY MOUTH DAILY   • ATORVASTATIN 20 MG Oral Tab [Pharmac

## 2021-11-17 RX ORDER — MONTELUKAST SODIUM 10 MG/1
TABLET ORAL
Qty: 90 TABLET | Refills: 0 | Status: SHIPPED | OUTPATIENT
Start: 2021-11-17

## 2021-11-28 DIAGNOSIS — G47.00 INSOMNIA, UNSPECIFIED TYPE: ICD-10-CM

## 2021-11-29 RX ORDER — ZOLPIDEM TARTRATE 10 MG/1
TABLET ORAL
Qty: 90 TABLET | Refills: 0 | Status: SHIPPED | OUTPATIENT
Start: 2021-11-29

## 2021-11-29 NOTE — TELEPHONE ENCOUNTER
Requested Prescriptions     Pending Prescriptions Disp Refills   • ZOLPIDEM 10 MG Oral Tab [Pharmacy Med Name: ZOLPIDEM 10MG TABLETS] 90 tablet 0     Sig: TAKE 1 TABLET BY MOUTH EVERY NIGHT     Last refill #90 on 8/27/2021  Last office visit pertaining to

## 2021-12-01 DIAGNOSIS — E03.9 ACQUIRED HYPOTHYROIDISM: ICD-10-CM

## 2021-12-02 RX ORDER — LEVOTHYROXINE SODIUM 0.05 MG/1
TABLET ORAL
Qty: 90 TABLET | Refills: 0 | Status: SHIPPED | OUTPATIENT
Start: 2021-12-02

## 2021-12-02 NOTE — TELEPHONE ENCOUNTER
Requested Prescriptions     Pending Prescriptions Disp Refills   • LEVOTHYROXINE 50 MCG Oral Tab [Pharmacy Med Name: LEVOTHYROXINE 0.05MG (50MCG) TAB] 90 tablet 3     Sig: TAKE 1 TABLET(50 MCG) BY MOUTH EVERY MORNING BEFORE BREAKFAST     Last refill x 1 ye

## 2021-12-31 DIAGNOSIS — J45.20 ASTHMA, STABLE, MILD INTERMITTENT: ICD-10-CM

## 2022-01-03 RX ORDER — ALBUTEROL SULFATE 90 UG/1
2 AEROSOL, METERED RESPIRATORY (INHALATION) EVERY 6 HOURS PRN
Qty: 3 EACH | Refills: 0 | Status: SHIPPED | OUTPATIENT
Start: 2022-01-03 | End: 2023-01-03

## 2022-01-03 NOTE — TELEPHONE ENCOUNTER
Requested Prescriptions     Pending Prescriptions Disp Refills   • albuterol 108 (90 Base) MCG/ACT Inhalation Aero Soln  0     Sig: Inhale 2 puffs into the lungs every 6 (six) hours as needed for Wheezing.      Last refill #3 x 3 on 3/30/2021  Last office v

## 2022-01-10 RX ORDER — VENLAFAXINE HYDROCHLORIDE 75 MG/1
CAPSULE, EXTENDED RELEASE ORAL
Qty: 90 CAPSULE | Refills: 0 | Status: SHIPPED | OUTPATIENT
Start: 2022-01-10

## 2022-01-10 NOTE — TELEPHONE ENCOUNTER
Requested Prescriptions     Pending Prescriptions Disp Refills   • VENLAFAXINE 75 MG Oral Capsule SR 24 Hr [Pharmacy Med Name: VENLAFAXINE ER 75MG CAPSULES] 90 capsule 0     Sig: TAKE 1 CAPSULE(75 MG) BY MOUTH DAILY     Last refill #90 on 10/18/2021  Last

## 2022-02-14 RX ORDER — MONTELUKAST SODIUM 10 MG/1
TABLET ORAL
Qty: 90 TABLET | Refills: 0 | Status: SHIPPED | OUTPATIENT
Start: 2022-02-14

## 2022-02-14 RX ORDER — LEVOTHYROXINE SODIUM 0.05 MG/1
50 TABLET ORAL
Qty: 90 TABLET | Refills: 0 | Status: SHIPPED | OUTPATIENT
Start: 2022-02-14

## 2022-03-12 ENCOUNTER — TELEPHONE (OUTPATIENT)
Dept: FAMILY MEDICINE CLINIC | Facility: CLINIC | Age: 62
End: 2022-03-12

## 2022-03-12 NOTE — TELEPHONE ENCOUNTER
Ally your Mammogram normal. Repeat in 1 year. Continue monthly self breast exam. Follow up with any palpable or visible abnormality.

## 2022-03-14 ENCOUNTER — MED REC SCAN ONLY (OUTPATIENT)
Dept: FAMILY MEDICINE CLINIC | Facility: CLINIC | Age: 62
End: 2022-03-14

## 2022-03-28 RX ORDER — VENLAFAXINE HYDROCHLORIDE 75 MG/1
CAPSULE, EXTENDED RELEASE ORAL
Qty: 90 CAPSULE | Refills: 0 | Status: SHIPPED | OUTPATIENT
Start: 2022-03-28

## 2022-03-28 RX ORDER — ZOLPIDEM TARTRATE 10 MG/1
TABLET ORAL
Qty: 90 TABLET | Refills: 0 | Status: SHIPPED | OUTPATIENT
Start: 2022-03-28

## 2022-03-28 NOTE — TELEPHONE ENCOUNTER
Last refills -   Zolpidem - 11/29/21 - #90   Venlafaxine - 1/10/22 - #90   Last office visit - 2/3/21  Future Appointments   Date Time Provider Tiff Camacho   4/20/2022  4:30 PM Reed Armas,  [facility] [place]

## 2022-05-02 LAB
AMB EXT BILIRUBIN, TOTAL: 0.5 MG/DL
AMB EXT BUN: 23 MG/DL
AMB EXT CALCIUM: 9.9
AMB EXT CARBON DIOXIDE: 23
AMB EXT CHLORIDE: 102
AMB EXT CMP ALT: 22 U/L
AMB EXT CMP AST: 15 U/L
AMB EXT CREATININE: 0.68 MG/DL
AMB EXT EGFR NON-AA: 90
AMB EXT GLUCOSE: 135 MG/DL
AMB EXT GLUCOSE: 135 MG/DL
AMB EXT HEMATOCRIT: 42
AMB EXT HEMOGLOBIN: 14.4
AMB EXT HGBA1C: 5.9 %
AMB EXT MCV: 90
AMB EXT PLATELETS: 341
AMB EXT POSTASSIUM: 4.5 MMOL/L
AMB EXT SODIUM: 138 MMOL/L
AMB EXT TOTAL PROTEIN: 7
AMB EXT TSH: 0.65 MIU/ML
AMB EXT WBC: 9.5 X10(3)UL

## 2022-05-03 ENCOUNTER — OFFICE VISIT (OUTPATIENT)
Dept: FAMILY MEDICINE CLINIC | Facility: CLINIC | Age: 62
End: 2022-05-03
Payer: COMMERCIAL

## 2022-05-03 VITALS
HEART RATE: 81 BPM | HEIGHT: 61 IN | TEMPERATURE: 97 F | BODY MASS INDEX: 30.66 KG/M2 | OXYGEN SATURATION: 96 % | WEIGHT: 162.38 LBS | SYSTOLIC BLOOD PRESSURE: 154 MMHG | DIASTOLIC BLOOD PRESSURE: 90 MMHG | RESPIRATION RATE: 22 BRPM

## 2022-05-03 DIAGNOSIS — Z23 NEED FOR VACCINATION: ICD-10-CM

## 2022-05-03 DIAGNOSIS — K21.9 GASTROESOPHAGEAL REFLUX DISEASE WITHOUT ESOPHAGITIS: ICD-10-CM

## 2022-05-03 DIAGNOSIS — Z01.419 ENCOUNTER FOR WELL WOMAN EXAM: Primary | ICD-10-CM

## 2022-05-03 DIAGNOSIS — J45.20 ASTHMA, STABLE, MILD INTERMITTENT: ICD-10-CM

## 2022-05-03 DIAGNOSIS — R73.03 PREDIABETES: ICD-10-CM

## 2022-05-03 DIAGNOSIS — E78.00 HYPERCHOLESTEREMIA: ICD-10-CM

## 2022-05-03 DIAGNOSIS — I10 PRIMARY HYPERTENSION: ICD-10-CM

## 2022-05-03 DIAGNOSIS — I51.81 TAKOTSUBO CARDIOMYOPATHY: ICD-10-CM

## 2022-05-03 DIAGNOSIS — J30.89 NON-SEASONAL ALLERGIC RHINITIS DUE TO OTHER ALLERGIC TRIGGER: ICD-10-CM

## 2022-05-03 DIAGNOSIS — E03.9 ACQUIRED HYPOTHYROIDISM: ICD-10-CM

## 2022-05-03 PROCEDURE — 3077F SYST BP >= 140 MM HG: CPT | Performed by: FAMILY MEDICINE

## 2022-05-03 PROCEDURE — 3008F BODY MASS INDEX DOCD: CPT | Performed by: FAMILY MEDICINE

## 2022-05-03 PROCEDURE — 87624 HPV HI-RISK TYP POOLED RSLT: CPT | Performed by: FAMILY MEDICINE

## 2022-05-03 PROCEDURE — 99213 OFFICE O/P EST LOW 20 MIN: CPT | Performed by: FAMILY MEDICINE

## 2022-05-03 PROCEDURE — 88175 CYTOPATH C/V AUTO FLUID REDO: CPT | Performed by: FAMILY MEDICINE

## 2022-05-03 PROCEDURE — 99396 PREV VISIT EST AGE 40-64: CPT | Performed by: FAMILY MEDICINE

## 2022-05-03 PROCEDURE — 96127 BRIEF EMOTIONAL/BEHAV ASSMT: CPT | Performed by: FAMILY MEDICINE

## 2022-05-03 PROCEDURE — 3080F DIAST BP >= 90 MM HG: CPT | Performed by: FAMILY MEDICINE

## 2022-05-03 RX ORDER — PREDNISONE 20 MG/1
20 TABLET ORAL 2 TIMES DAILY
Qty: 10 TABLET | Refills: 3 | Status: SHIPPED | OUTPATIENT
Start: 2022-05-03 | End: 2022-05-08

## 2022-05-03 RX ORDER — EZETIMIBE 10 MG/1
10 TABLET ORAL DAILY
Qty: 90 TABLET | Refills: 3 | Status: SHIPPED | OUTPATIENT
Start: 2022-05-03 | End: 2023-04-28

## 2022-05-03 RX ORDER — METHYLPREDNISOLONE 4 MG/1
4 TABLET ORAL AS DIRECTED
COMMUNITY
Start: 2022-04-30

## 2022-05-03 RX ORDER — BENAZEPRIL HYDROCHLORIDE 10 MG/1
10 TABLET ORAL DAILY
Qty: 90 TABLET | Refills: 0 | Status: SHIPPED | OUTPATIENT
Start: 2022-05-03

## 2022-05-04 LAB — HPV I/H RISK 1 DNA SPEC QL NAA+PROBE: NEGATIVE

## 2022-05-09 RX ORDER — LEVOTHYROXINE SODIUM 0.05 MG/1
TABLET ORAL
Qty: 90 TABLET | Refills: 0 | Status: SHIPPED | OUTPATIENT
Start: 2022-05-09

## 2022-05-09 RX ORDER — MONTELUKAST SODIUM 10 MG/1
TABLET ORAL
Qty: 90 TABLET | Refills: 0 | Status: SHIPPED | OUTPATIENT
Start: 2022-05-09

## 2022-05-13 RX ORDER — ATORVASTATIN CALCIUM 20 MG/1
TABLET, FILM COATED ORAL
Qty: 90 TABLET | Refills: 1 | Status: SHIPPED | OUTPATIENT
Start: 2022-05-13

## 2022-05-13 RX ORDER — VENLAFAXINE HYDROCHLORIDE 75 MG/1
CAPSULE, EXTENDED RELEASE ORAL
Qty: 90 CAPSULE | Refills: 0 | Status: SHIPPED | OUTPATIENT
Start: 2022-05-13

## 2022-07-07 DIAGNOSIS — G47.00 INSOMNIA, UNSPECIFIED TYPE: ICD-10-CM

## 2022-07-07 RX ORDER — ZOLPIDEM TARTRATE 10 MG/1
TABLET ORAL
Qty: 90 TABLET | Refills: 0 | Status: SHIPPED | OUTPATIENT
Start: 2022-07-07

## 2022-07-26 DIAGNOSIS — E03.9 ACQUIRED HYPOTHYROIDISM: ICD-10-CM

## 2022-07-27 RX ORDER — LEVOTHYROXINE SODIUM 0.05 MG/1
TABLET ORAL
Qty: 90 TABLET | Refills: 1 | Status: SHIPPED | OUTPATIENT
Start: 2022-07-27

## 2022-07-27 NOTE — TELEPHONE ENCOUNTER
Thyroid Supplements Protocol Passed 07/26/2022 05:59 PM   Protocol Details  TSH test in past 12 months    TSH value between 0.350 and 5.500 IU/ml    Appointment in past 12 or next 3 months        Last office visit:  05/03/22  Last refill:  05/09/22  #90, no refills  Last tsh:  05/02/22      No future appointments.

## 2022-07-31 DIAGNOSIS — I10 PRIMARY HYPERTENSION: ICD-10-CM

## 2022-07-31 DIAGNOSIS — R73.03 PREDIABETES: ICD-10-CM

## 2022-08-01 RX ORDER — BENAZEPRIL HYDROCHLORIDE 10 MG/1
TABLET ORAL
Qty: 90 TABLET | Refills: 0 | Status: SHIPPED | OUTPATIENT
Start: 2022-08-01

## 2022-08-11 RX ORDER — VENLAFAXINE HYDROCHLORIDE 75 MG/1
CAPSULE, EXTENDED RELEASE ORAL
Qty: 90 CAPSULE | Refills: 0 | Status: SHIPPED | OUTPATIENT
Start: 2022-08-11

## 2022-08-11 RX ORDER — MONTELUKAST SODIUM 10 MG/1
TABLET ORAL
Qty: 90 TABLET | Refills: 0 | Status: SHIPPED | OUTPATIENT
Start: 2022-08-11

## 2022-08-26 ENCOUNTER — PATIENT MESSAGE (OUTPATIENT)
Dept: FAMILY MEDICINE CLINIC | Facility: CLINIC | Age: 62
End: 2022-08-26

## 2022-08-26 DIAGNOSIS — E78.00 HYPERCHOLESTEREMIA: Primary | ICD-10-CM

## 2022-08-26 DIAGNOSIS — E03.9 ACQUIRED HYPOTHYROIDISM: ICD-10-CM

## 2022-08-27 NOTE — TELEPHONE ENCOUNTER
From: Paula Sanz  To: Leonela Elias, DO  Sent: 8/26/2022 2:46 PM CDT  Subject: LAB ORDER     Been having occasional palpitations this week, showing PVCs on my Apple Watch. No pain or dyspnea or other symptoms. Last time I had PVCs my Thyroid was off and had to increase my med dose. Was wondering if I could get order sent to [facility] to have it checked again, and do recheck of my lipid profile as suggested. Also, Had a mod case of COVID (July 15~26) recently, but doing well with not much asthma flare up.

## 2022-08-30 LAB
AMB EXT CHOL/HDL RATIO: 7
AMB EXT CHOLESTEROL, TOTAL: 272 MG/DL
AMB EXT HDL CHOLESTEROL: 41 MG/DL
AMB EXT LDL CHOLESTEROL, DIRECT: 185 MG/DL
AMB EXT TSH: 0.87 MIU/ML

## 2022-09-02 ENCOUNTER — TELEPHONE (OUTPATIENT)
Dept: FAMILY MEDICINE CLINIC | Facility: CLINIC | Age: 62
End: 2022-09-02

## 2022-09-02 DIAGNOSIS — E78.00 HYPERCHOLESTEREMIA: Primary | ICD-10-CM

## 2022-09-02 RX ORDER — ATORVASTATIN CALCIUM 40 MG/1
40 TABLET, FILM COATED ORAL NIGHTLY
Qty: 90 TABLET | Refills: 0 | Status: SHIPPED | OUTPATIENT
Start: 2022-09-02

## 2022-09-02 NOTE — TELEPHONE ENCOUNTER
Eri Sotelo your thyroid is good, continue levothyroxine 50 mcg daily and rechekc in 1 year.      Your lipids are too high I recommend increasing atorvastatin to 40  Mg and continue zetia 10 mg. Recheck in 3 months

## 2022-09-03 ENCOUNTER — MED REC SCAN ONLY (OUTPATIENT)
Dept: FAMILY MEDICINE CLINIC | Facility: CLINIC | Age: 62
End: 2022-09-03

## 2022-09-17 DIAGNOSIS — G47.00 INSOMNIA, UNSPECIFIED TYPE: ICD-10-CM

## 2022-09-19 DIAGNOSIS — G47.00 INSOMNIA, UNSPECIFIED TYPE: ICD-10-CM

## 2022-09-19 RX ORDER — ZOLPIDEM TARTRATE 10 MG/1
TABLET ORAL
Qty: 90 TABLET | Refills: 0 | OUTPATIENT
Start: 2022-09-19

## 2022-09-19 RX ORDER — ZOLPIDEM TARTRATE 10 MG/1
TABLET ORAL
Qty: 90 TABLET | Refills: 0 | Status: SHIPPED | OUTPATIENT
Start: 2022-09-19

## 2022-10-19 RX ORDER — MONTELUKAST SODIUM 10 MG/1
10 TABLET ORAL NIGHTLY
Qty: 90 TABLET | Refills: 0 | Status: SHIPPED | OUTPATIENT
Start: 2022-10-19

## 2022-10-27 DIAGNOSIS — R73.03 PREDIABETES: ICD-10-CM

## 2022-10-27 DIAGNOSIS — I10 PRIMARY HYPERTENSION: ICD-10-CM

## 2022-10-28 RX ORDER — BENAZEPRIL HYDROCHLORIDE 10 MG/1
TABLET ORAL
Qty: 90 TABLET | Refills: 3 | Status: SHIPPED | OUTPATIENT
Start: 2022-10-28

## 2022-11-09 DIAGNOSIS — E78.00 HYPERCHOLESTEREMIA: ICD-10-CM

## 2022-11-09 RX ORDER — ATORVASTATIN CALCIUM 20 MG/1
TABLET, FILM COATED ORAL
Qty: 90 TABLET | Refills: 0 | Status: SHIPPED | OUTPATIENT
Start: 2022-11-09

## 2022-11-09 RX ORDER — MONTELUKAST SODIUM 10 MG/1
TABLET ORAL
Qty: 90 TABLET | Refills: 0 | Status: SHIPPED | OUTPATIENT
Start: 2022-11-09

## 2022-11-09 NOTE — TELEPHONE ENCOUNTER
Cholesterol Medication Protocol Passed 11/09/2022 06:05 AM   Protocol Details  ALT < 80    ALT resulted within past year    Lipid panel within past 12 months    Appointment within past 12 or next 3 months         Asthma & COPD Medication Protocol Failed 11/09/2022 06:05 AM    Asthma Action Score greater than or equal to 20    Appointment in past 6 or next 3 months     AAP/ACT given in last 12 months        Last office visit:  05/03/22  Last lipid:  08/30/22  Last act/aap:  02/03/21  BP Readings from Last 3 Encounters:  05/03/22 : 154/90  02/03/21 : 140/70  02/18/20 : 130/78    Montelukast:  10/19/22  #90, no refills  Atorvastatin:  09/02/22  #90, no refills    No future appointments.

## 2023-01-23 DIAGNOSIS — G47.00 INSOMNIA, UNSPECIFIED TYPE: ICD-10-CM

## 2023-01-24 RX ORDER — ZOLPIDEM TARTRATE 10 MG/1
TABLET ORAL
Qty: 90 TABLET | Refills: 0 | Status: SHIPPED | OUTPATIENT
Start: 2023-01-24

## 2023-02-02 DIAGNOSIS — E03.9 ACQUIRED HYPOTHYROIDISM: ICD-10-CM

## 2023-02-02 RX ORDER — LEVOTHYROXINE SODIUM 0.05 MG/1
TABLET ORAL
Qty: 90 TABLET | Refills: 1 | Status: SHIPPED | OUTPATIENT
Start: 2023-02-02

## 2023-02-02 NOTE — TELEPHONE ENCOUNTER
LOV 5-3-22    LAST LAB 8-30-22 TSH 0.870    LAST RX  7-27-22 #90 1 RF    Next OV No future appointments.     PROTOCOL   Thyroid Supplements Protocol Passed 02/02/2023 04:28 PM   Protocol Details  TSH test in past 12 months    TSH value between 0.350 and 5.500 IU/ml    Appointment in past 12 or next 3 months

## 2023-02-07 ENCOUNTER — PATIENT MESSAGE (OUTPATIENT)
Dept: FAMILY MEDICINE CLINIC | Facility: CLINIC | Age: 63
End: 2023-02-07

## 2023-02-07 DIAGNOSIS — E78.00 HYPERCHOLESTEREMIA: ICD-10-CM

## 2023-02-07 DIAGNOSIS — I10 PRIMARY HYPERTENSION: Primary | ICD-10-CM

## 2023-02-07 RX ORDER — LOSARTAN POTASSIUM 50 MG/1
50 TABLET ORAL DAILY
Qty: 30 TABLET | Refills: 0 | Status: SHIPPED | OUTPATIENT
Start: 2023-02-07

## 2023-02-07 RX ORDER — ATORVASTATIN CALCIUM 20 MG/1
TABLET, FILM COATED ORAL
Qty: 90 TABLET | Refills: 0 | Status: SHIPPED | OUTPATIENT
Start: 2023-02-07

## 2023-02-07 NOTE — TELEPHONE ENCOUNTER
From: El [place]  To: Finn Delgadillo DO  Sent: 2/7/2023 1:39 PM CST  Subject: Benazapril    My BP is doing well, for a while the systolic was still a little high, but 122/71 this AM.   However, I have been having a very persistent dry scratchy hacky cough that does not seem related to my asthma and is not COVID. Thinking it may be a side effect of the benazapril. Is there something else we could try?

## 2023-02-07 NOTE — TELEPHONE ENCOUNTER
Cholesterol Medication Protocol Passed 02/07/2023 06:04 AM   Protocol Details  ALT < 80    ALT resulted within past year    Lipid panel within past 12 months    Appointment within past 12 or next 3 months        Last office visit:  05/03/22  Last refill:  09/02/22  #90, no refills  Last lipid:  08/30/22       No future appointments.

## 2023-02-20 RX ORDER — LOSARTAN POTASSIUM AND HYDROCHLOROTHIAZIDE 12.5; 5 MG/1; MG/1
1 TABLET ORAL DAILY
Qty: 90 TABLET | Refills: 0 | Status: SHIPPED | OUTPATIENT
Start: 2023-02-20 | End: 2024-02-15

## 2023-02-20 NOTE — TELEPHONE ENCOUNTER
Ally, I will refill the losartan 50 with added hydrochlorothiazide 12.5 mg to take in am. The water pill component should bring the systolic number down. If the BP. Try to take only once a day. If evening is still high I may need to add a separate lotensin dose. I'm glad the cough is gone. Let me know what your BP does  Over the next few weeks.  See you for your physical.

## 2023-03-15 ENCOUNTER — MED REC SCAN ONLY (OUTPATIENT)
Dept: FAMILY MEDICINE CLINIC | Facility: CLINIC | Age: 63
End: 2023-03-15

## 2023-04-05 ENCOUNTER — PATIENT MESSAGE (OUTPATIENT)
Dept: FAMILY MEDICINE CLINIC | Facility: CLINIC | Age: 63
End: 2023-04-05

## 2023-04-05 DIAGNOSIS — Z01.419 ENCOUNTER FOR WELL WOMAN EXAM: Primary | ICD-10-CM

## 2023-04-05 NOTE — TELEPHONE ENCOUNTER
From: Sergio Archibald  To: Ammon Salvador DO  Sent: 4/5/2023 8:57 AM CDT  Subject: Annual lab Orders: please fax to [address] @ [phone]    Could you please fax orders for me to get my annual labs done at [facility] before my May appointment? If you fax them directly to registration at [address] it helps expedite the process! Their fax is [phone]    Thank you so much!

## 2023-04-18 ENCOUNTER — TELEPHONE (OUTPATIENT)
Dept: FAMILY MEDICINE CLINIC | Facility: CLINIC | Age: 63
End: 2023-04-18

## 2023-04-18 DIAGNOSIS — Z01.419 ENCOUNTER FOR WELL WOMAN EXAM: Primary | ICD-10-CM

## 2023-04-18 NOTE — TELEPHONE ENCOUNTER
Refaxed labs to [facility] at fax [phone]. Note number given by pt was incorrect.

## 2023-04-19 LAB
AMB EXT BILIRUBIN, TOTAL: 0.4 MG/DL
AMB EXT BUN: 18 MG/DL
AMB EXT CALCIUM: 9.7
AMB EXT CARBON DIOXIDE: 30
AMB EXT CHLORIDE: 103
AMB EXT CHOL/HDL RATIO: 6
AMB EXT CHOLESTEROL, TOTAL: 237 MG/DL
AMB EXT CMP ALT: 24 U/L
AMB EXT CMP AST: 19 U/L
AMB EXT CREATININE: 0.78 MG/DL
AMB EXT EGFR NON-AA: 86
AMB EXT GLUCOSE: 98 MG/DL
AMB EXT HDL CHOLESTEROL: 44 MG/DL
AMB EXT HEMATOCRIT: 40.1
AMB EXT HEMOGLOBIN: 13.8
AMB EXT HGBA1C: 5.4 %
AMB EXT LDL CHOLESTEROL, DIRECT: 161 MG/DL
AMB EXT MCV: 89.9
AMB EXT NON HDL CHOL: 193 MG/DL
AMB EXT PLATELETS: 290
AMB EXT POSTASSIUM: 4.2 MMOL/L
AMB EXT SODIUM: 140 MMOL/L
AMB EXT TOTAL PROTEIN: 6.9
AMB EXT TRIGLYCERIDES: 160 MG/DL
AMB EXT TSH: 1.62 MIU/ML
AMB EXT WBC: 6.3 X10(3)UL

## 2023-04-27 ENCOUNTER — TELEPHONE (OUTPATIENT)
Dept: FAMILY MEDICINE CLINIC | Facility: CLINIC | Age: 63
End: 2023-04-27

## 2023-04-27 NOTE — TELEPHONE ENCOUNTER
Spoke with patient, blood work results were reviewed and pt notified of provider comments. Verbalized full understanding. Patient reports she stopped taking atorvastatin 5/2022 and has been on red yeast rice, CoQ, niacin. Verified she is taking Zetia but at this time she does not want to take a statin. Patrice will discuss further at upcoming appointment. Future Appointments   Date Time Provider Tiff Camacho   5/23/2023  7:15 AM Marianne Armas Given, DO [facility] [place]     Should she still check lipids in 3 months?

## 2023-04-27 NOTE — TELEPHONE ENCOUNTER
Ally,    All of your labs drawn at [facility] are great. A1C normal no DM  COMP - normal liver and kidney function  CBC- no anemia  TSH - therapeutic- continue current synthroid 50 mcg  LIPIDS are still elevated. LDL is161, HDL-44. Goal is to have LDL below 100. Reviewing your chart  In 2/2023 you were on atorvastatin 20 mg  6 months prior 9/2022 you were on atorvatsatin 40 and zetia 10. If you are on the lower dose of 20 I want to increase to 40 mg and recheck in 3 months.    See you at your physical end of May

## 2023-05-03 DIAGNOSIS — I10 PRIMARY HYPERTENSION: ICD-10-CM

## 2023-05-03 DIAGNOSIS — G47.00 INSOMNIA, UNSPECIFIED TYPE: ICD-10-CM

## 2023-05-03 DIAGNOSIS — E78.00 HYPERCHOLESTEREMIA: ICD-10-CM

## 2023-05-04 RX ORDER — MONTELUKAST SODIUM 10 MG/1
TABLET ORAL
Qty: 90 TABLET | Refills: 0 | Status: SHIPPED | OUTPATIENT
Start: 2023-05-04

## 2023-05-04 RX ORDER — LOSARTAN POTASSIUM AND HYDROCHLOROTHIAZIDE 12.5; 5 MG/1; MG/1
1 TABLET ORAL DAILY
Qty: 90 TABLET | Refills: 0 | Status: SHIPPED | OUTPATIENT
Start: 2023-05-04 | End: 2024-04-28

## 2023-05-04 RX ORDER — EZETIMIBE 10 MG/1
TABLET ORAL
Qty: 90 TABLET | Refills: 3 | Status: SHIPPED | OUTPATIENT
Start: 2023-05-04

## 2023-05-04 RX ORDER — ZOLPIDEM TARTRATE 10 MG/1
TABLET ORAL
Qty: 90 TABLET | Refills: 0 | Status: SHIPPED | OUTPATIENT
Start: 2023-05-04

## 2023-05-04 NOTE — TELEPHONE ENCOUNTER
Zolpidem   Last refilled 1/24/23 #90/0 refills    Montelukast   Last refilled 11/9/22    Losartan-hydrochlorothiazide   Last refilled 2/20/23 #90/0 refills    Ezetimibe   Last refilled 5/3/22 #90/3 refills    Last ov 5/3/22  Pending 5/23/23

## 2023-05-08 DIAGNOSIS — E78.00 HYPERCHOLESTEREMIA: ICD-10-CM

## 2023-05-08 DIAGNOSIS — F32.89 OTHER DEPRESSION: ICD-10-CM

## 2023-05-08 RX ORDER — VENLAFAXINE HYDROCHLORIDE 75 MG/1
CAPSULE, EXTENDED RELEASE ORAL
Qty: 90 CAPSULE | Refills: 1 | OUTPATIENT
Start: 2023-05-08

## 2023-05-08 RX ORDER — ATORVASTATIN CALCIUM 20 MG/1
TABLET, FILM COATED ORAL
Qty: 90 TABLET | Refills: 0 | Status: SHIPPED | OUTPATIENT
Start: 2023-05-08

## 2023-05-23 ENCOUNTER — OFFICE VISIT (OUTPATIENT)
Dept: FAMILY MEDICINE CLINIC | Facility: CLINIC | Age: 63
End: 2023-05-23
Payer: COMMERCIAL

## 2023-05-23 VITALS
BODY MASS INDEX: 30.74 KG/M2 | RESPIRATION RATE: 18 BRPM | OXYGEN SATURATION: 98 % | TEMPERATURE: 99 F | HEART RATE: 64 BPM | DIASTOLIC BLOOD PRESSURE: 72 MMHG | SYSTOLIC BLOOD PRESSURE: 112 MMHG | WEIGHT: 162.81 LBS | HEIGHT: 61 IN

## 2023-05-23 DIAGNOSIS — G47.00 INSOMNIA, UNSPECIFIED TYPE: ICD-10-CM

## 2023-05-23 DIAGNOSIS — K21.9 GASTROESOPHAGEAL REFLUX DISEASE WITHOUT ESOPHAGITIS: ICD-10-CM

## 2023-05-23 DIAGNOSIS — E03.9 ACQUIRED HYPOTHYROIDISM: ICD-10-CM

## 2023-05-23 DIAGNOSIS — I10 PRIMARY HYPERTENSION: ICD-10-CM

## 2023-05-23 DIAGNOSIS — Z23 NEED FOR VACCINATION: ICD-10-CM

## 2023-05-23 DIAGNOSIS — J45.20 ASTHMA, STABLE, MILD INTERMITTENT: ICD-10-CM

## 2023-05-23 DIAGNOSIS — Z00.00 ROUTINE ADULT HEALTH MAINTENANCE: Primary | ICD-10-CM

## 2023-05-23 DIAGNOSIS — F32.9 REACTIVE DEPRESSION: ICD-10-CM

## 2023-05-23 DIAGNOSIS — E78.00 HYPERCHOLESTEREMIA: ICD-10-CM

## 2023-05-23 DIAGNOSIS — J30.89 NON-SEASONAL ALLERGIC RHINITIS DUE TO OTHER ALLERGIC TRIGGER: ICD-10-CM

## 2023-05-23 DIAGNOSIS — I51.81 TAKOTSUBO CARDIOMYOPATHY: ICD-10-CM

## 2023-05-23 PROCEDURE — 3078F DIAST BP <80 MM HG: CPT | Performed by: FAMILY MEDICINE

## 2023-05-23 PROCEDURE — 3074F SYST BP LT 130 MM HG: CPT | Performed by: FAMILY MEDICINE

## 2023-05-23 PROCEDURE — 99396 PREV VISIT EST AGE 40-64: CPT | Performed by: FAMILY MEDICINE

## 2023-05-23 PROCEDURE — 3008F BODY MASS INDEX DOCD: CPT | Performed by: FAMILY MEDICINE

## 2023-05-23 RX ORDER — FLUTICASONE PROPIONATE AND SALMETEROL 250; 50 UG/1; UG/1
1 POWDER RESPIRATORY (INHALATION) EVERY 12 HOURS SCHEDULED
Qty: 1 EACH | Refills: 3 | Status: SHIPPED | OUTPATIENT
Start: 2023-05-23

## 2023-05-23 RX ORDER — VENLAFAXINE HYDROCHLORIDE 37.5 MG/1
37.5 CAPSULE, EXTENDED RELEASE ORAL DAILY
Qty: 90 CAPSULE | Refills: 0 | Status: SHIPPED | OUTPATIENT
Start: 2023-05-23

## 2023-05-23 RX ORDER — MONTELUKAST SODIUM 10 MG/1
10 TABLET ORAL NIGHTLY
Qty: 90 TABLET | Refills: 3 | Status: SHIPPED | OUTPATIENT
Start: 2023-05-23

## 2023-06-06 DIAGNOSIS — F32.89 OTHER DEPRESSION: ICD-10-CM

## 2023-06-06 RX ORDER — VENLAFAXINE HYDROCHLORIDE 75 MG/1
CAPSULE, EXTENDED RELEASE ORAL
Qty: 90 CAPSULE | Refills: 0 | Status: SHIPPED | OUTPATIENT
Start: 2023-06-06

## 2023-07-31 DIAGNOSIS — E03.9 ACQUIRED HYPOTHYROIDISM: ICD-10-CM

## 2023-07-31 DIAGNOSIS — I10 PRIMARY HYPERTENSION: ICD-10-CM

## 2023-07-31 RX ORDER — LOSARTAN POTASSIUM AND HYDROCHLOROTHIAZIDE 12.5; 5 MG/1; MG/1
1 TABLET ORAL DAILY
Qty: 90 TABLET | Refills: 1 | Status: SHIPPED | OUTPATIENT
Start: 2023-07-31 | End: 2024-01-27

## 2023-07-31 RX ORDER — LEVOTHYROXINE SODIUM 0.05 MG/1
50 TABLET ORAL
Qty: 90 TABLET | Refills: 1 | Status: SHIPPED | OUTPATIENT
Start: 2023-07-31

## 2023-07-31 NOTE — TELEPHONE ENCOUNTER
Levothyroxine 50 MCG oral tab  Thyroid Supplements Protocol [id number]/31/2023 08:58 AM   Protocol Details TSH test in past 12 months    TSH value between 0.350 and 5.500 IU/ml    Appointment in past 12 or next 3 months   Last office visit:  5/23/23  No future appointments.   Last filled:  2/2/23  #90 with 1 refill   Last labs:  4/19/23  TSH:  1.62

## 2023-08-25 DIAGNOSIS — J30.89 NON-SEASONAL ALLERGIC RHINITIS DUE TO OTHER ALLERGIC TRIGGER: ICD-10-CM

## 2023-08-25 DIAGNOSIS — J45.20 ASTHMA, STABLE, MILD INTERMITTENT: ICD-10-CM

## 2023-08-25 DIAGNOSIS — G47.00 INSOMNIA, UNSPECIFIED TYPE: ICD-10-CM

## 2023-08-25 RX ORDER — MONTELUKAST SODIUM 10 MG/1
10 TABLET ORAL NIGHTLY
Qty: 90 TABLET | Refills: 3 | Status: SHIPPED | OUTPATIENT
Start: 2023-08-25

## 2023-08-25 RX ORDER — ZOLPIDEM TARTRATE 10 MG/1
10 TABLET ORAL NIGHTLY
Qty: 90 TABLET | Refills: 0 | Status: SHIPPED | OUTPATIENT
Start: 2023-08-25

## 2023-08-25 NOTE — TELEPHONE ENCOUNTER
montelukast 10 MG Oral Tab     LOV  5-23-23    LAST LAB  4-19-23    LAST RX  5-23-23  #90 RF 3 (Change in pharmacy)    Next OV No future appointments. PROTOCOL  Asthma & COPD Medication Protocol Failed08/25/2023 10:56 AM    AAP/ACT given in last 12 months    Asthma Action Score greater than or equal to 20    Appointment in past 6 or next 3 months     zolpidem 10 MG Oral Tab       LOV  5-23-23    LAST LAB  4-19-23    LAST RX  5-4-23 #90    Next OV  No future appointments.     PROTOCOL None

## 2023-09-11 DIAGNOSIS — F32.89 OTHER DEPRESSION: ICD-10-CM

## 2023-09-11 RX ORDER — VENLAFAXINE HYDROCHLORIDE 75 MG/1
75 CAPSULE, EXTENDED RELEASE ORAL DAILY
Qty: 90 CAPSULE | Refills: 2 | Status: SHIPPED | OUTPATIENT
Start: 2023-09-11

## 2023-09-11 NOTE — TELEPHONE ENCOUNTER
Requested Prescriptions     Pending Prescriptions Disp Refills    venlafaxine ER 75 MG Oral Capsule SR 24 Hr 90 capsule 0     Sig: Take 1 capsule (75 mg total) by mouth daily. Last refill 6/6/23 #90  LOV 5/23/23  No future appointments.

## 2023-11-20 DIAGNOSIS — G47.00 INSOMNIA, UNSPECIFIED TYPE: ICD-10-CM

## 2023-11-20 RX ORDER — ZOLPIDEM TARTRATE 10 MG/1
10 TABLET ORAL NIGHTLY
Qty: 90 TABLET | Refills: 0 | Status: SHIPPED | OUTPATIENT
Start: 2023-11-20

## 2024-01-26 DIAGNOSIS — E03.9 ACQUIRED HYPOTHYROIDISM: ICD-10-CM

## 2024-01-26 RX ORDER — LEVOTHYROXINE SODIUM 0.05 MG/1
50 TABLET ORAL
Qty: 90 TABLET | Refills: 1 | Status: SHIPPED | OUTPATIENT
Start: 2024-01-26

## 2024-02-05 DIAGNOSIS — E03.9 ACQUIRED HYPOTHYROIDISM: ICD-10-CM

## 2024-02-05 RX ORDER — LEVOTHYROXINE SODIUM 0.05 MG/1
50 TABLET ORAL
Qty: 90 TABLET | Refills: 1 | Status: CANCELLED | OUTPATIENT
Start: 2024-02-05

## 2024-02-05 RX ORDER — LEVOTHYROXINE SODIUM 0.05 MG/1
50 TABLET ORAL
Qty: 30 TABLET | Refills: 1 | Status: SHIPPED | OUTPATIENT
Start: 2024-02-05

## 2024-02-05 NOTE — TELEPHONE ENCOUNTER
Received faxed refill request from [facility] in [place], [place] for Levothyroxine 50 mcg.   Last refill was 1/26/24 at [facility] in [place].   Left message for patient to call.  Did she request this to be sent to a different pharmacy?    Patient calls back.  She will call [facility] in [place] to see if script can be transferred to [place], [place].    Will call back if needs anything further.    Patient calls back.  She does need medication sent to [place], [place].    Thyroid Supplements Protocol [id number]/05/2024 10:42 AM   Protocol Details TSH test in past 12 months    TSH value between 0.350 and 5.500 IU/ml    Appointment in past 12 or next 3 months     Last TSH - 4/19/23 - 1.62  Last office visit - 5/23/23   Refilled per protocol

## 2024-02-10 DIAGNOSIS — G47.00 INSOMNIA, UNSPECIFIED TYPE: ICD-10-CM

## 2024-02-12 RX ORDER — ZOLPIDEM TARTRATE 10 MG/1
10 TABLET ORAL NIGHTLY
Qty: 90 TABLET | Refills: 0 | Status: SHIPPED | OUTPATIENT
Start: 2024-02-12

## 2024-03-25 DIAGNOSIS — G47.00 INSOMNIA, UNSPECIFIED TYPE: ICD-10-CM

## 2024-03-25 DIAGNOSIS — E03.9 ACQUIRED HYPOTHYROIDISM: ICD-10-CM

## 2024-03-25 RX ORDER — LEVOTHYROXINE SODIUM 0.05 MG/1
50 TABLET ORAL
Qty: 90 TABLET | Refills: 0 | Status: SHIPPED | OUTPATIENT
Start: 2024-03-25

## 2024-03-25 NOTE — TELEPHONE ENCOUNTER
Requested Prescriptions     Pending Prescriptions Disp Refills    levothyroxine 50 MCG Oral Tab 30 tablet 1     Sig: Take 1 tablet (50 mcg total) by mouth before breakfast.     Last refill 2/5/24 #30 x 1   Patient requesting a refill sent to an alternative pharmacy.  Thyroid Medication Protocol [id number]/25/2024 07:21 AM   Protocol Details TSH in past 12 months    Last TSH value is normal    In person appointment or virtual visit in the past 12 mos or appointment in next 3 mos       To be filled at: [facility] #[id number] - [place], [place] - [address] [facility] [address], [phone], [phone]           Last labs 4/19/23  LOV 5/23/23  Ok for #90? 03/25/2024 - Rx Request: Background, Arianna  (Newest Message First)  View All Conversations on this Encounter  March 25, 2024       MB    3/25/24  7:21 AM   Background, Arianna pended an order  Ally Thompson   to [facility] Clinical Staff         3/25/24  7:21 AM  Refills have been requested for the following medications:         levothyroxine 50 MCG Oral Tab [CHRISTIAN Armas]      Patient Comment: Change to #90 day supply     Preferred pharmacy: [facility]

## 2024-03-25 NOTE — TELEPHONE ENCOUNTER
Requested Prescriptions     Pending Prescriptions Disp Refills    zolpidem 10 MG Oral Tab 90 tablet 0     Sig: Take 1 tablet (10 mg total) by mouth nightly.     Last refill 2/12/24 #90  Refill requested too soon

## 2024-03-27 RX ORDER — ZOLPIDEM TARTRATE 10 MG/1
10 TABLET ORAL NIGHTLY
Qty: 90 TABLET | Refills: 0 | Status: SHIPPED | OUTPATIENT
Start: 2024-03-27

## 2024-06-26 ENCOUNTER — TELEPHONE (OUTPATIENT)
Dept: FAMILY MEDICINE CLINIC | Facility: CLINIC | Age: 64
End: 2024-06-26

## 2024-06-26 DIAGNOSIS — F32.89 OTHER DEPRESSION: ICD-10-CM

## 2024-06-26 RX ORDER — VENLAFAXINE HYDROCHLORIDE 75 MG/1
75 CAPSULE, EXTENDED RELEASE ORAL DAILY
Qty: 90 CAPSULE | Refills: 0 | Status: SHIPPED | OUTPATIENT
Start: 2024-06-26

## 2024-06-26 NOTE — TELEPHONE ENCOUNTER
Last refill 9/11/23 #90 2 ref  LOV 5/23/23  No future appointments.    PT NOW LIVES IN [place], PER DS THIS IS LAST 90 DAY FILL, PT NEEDS TO FIND NEW PROVIDER, NOTIFIED PT OF THIS, SHE UNDERSTANDS.

## 2024-07-03 DIAGNOSIS — G47.00 INSOMNIA, UNSPECIFIED TYPE: ICD-10-CM

## 2024-07-03 RX ORDER — ZOLPIDEM TARTRATE 10 MG/1
10 TABLET ORAL NIGHTLY
Qty: 60 TABLET | Refills: 0 | Status: SHIPPED | OUTPATIENT
Start: 2024-07-03

## 2024-07-03 NOTE — TELEPHONE ENCOUNTER
: Got an appointment here for Aug 16     Controlled Substance Medication [id number]/03/2024 02:57 PM    This medication is a controlled substance - forward to provider to refill          No future appointments.  Pt has moved, and states she has an appt in [place] in August.  Needs refill of her Zolpidem until then.

## 2024-07-08 ENCOUNTER — PATIENT OUTREACH (OUTPATIENT)
Dept: CASE MANAGEMENT | Age: 64
End: 2024-07-08

## 2024-07-08 DIAGNOSIS — J45.20: ICD-10-CM

## 2024-07-08 DIAGNOSIS — J30.89 NON-SEASONAL ALLERGIC RHINITIS DUE TO OTHER ALLERGIC TRIGGER: ICD-10-CM

## 2024-07-08 RX ORDER — MONTELUKAST SODIUM 10 MG/1
10 TABLET ORAL NIGHTLY
Qty: 90 TABLET | Refills: 3 | Status: CANCELLED | OUTPATIENT
Start: 2024-07-08

## 2024-07-08 NOTE — TELEPHONE ENCOUNTER
Received fax request from pharmacy for refill of Montelukast. Contacted patient. She states she does not need a refill at this time. She also reports moving to [place] and has a new PCP. Dr. Richard Lam in [place].

## 2024-07-08 NOTE — PROCEDURES
The office order for PCP removal request is Approved and finalized on July 8, 2024.    Removed CHRISTIAN Armas DO as the patient's Primary Care Physician

## 2025-05-06 DIAGNOSIS — F32.89 OTHER DEPRESSION: ICD-10-CM

## 2025-05-06 RX ORDER — VENLAFAXINE HYDROCHLORIDE 75 MG/1
75 CAPSULE, EXTENDED RELEASE ORAL DAILY
Qty: 90 CAPSULE | Refills: 0 | OUTPATIENT
Start: 2025-05-06

## 2025-05-06 NOTE — TELEPHONE ENCOUNTER
Psychiatric Non-Scheduled (Anti-Anxiety) [id number]/06/2025 05:09 AM   Protocol Details In person appointment or virtual visit in the past 6 mos or appointment in next 3 mos    Depression Screening completed within the past 12 months    Medication is active on med list        Last office visit: 05/23/23  Last refill:  THIS IS LAST REFILL, PT NOW LIVES IN [facility] 06/26/24    No future appointments.

## 2025-06-12 NOTE — TELEPHONE ENCOUNTER
-Take all meds as prescribed even if you start feeling better  May use tylenol or warm moist compress on the face for pain.   -May use saline nasal spray, netipot or flonase as desired  -If symptoms worsen or do not improve in 1 week follow up with urgent care or your primary care provider     We have not gotten any result for her covid test. I don't see it in care everywhere. The hospital should notify her of the result.

## (undated) DIAGNOSIS — E78.00 HYPERCHOLESTEREMIA: ICD-10-CM

## (undated) DIAGNOSIS — E03.9 ACQUIRED HYPOTHYROIDISM: ICD-10-CM

## (undated) DIAGNOSIS — G47.00 INSOMNIA, UNSPECIFIED TYPE: ICD-10-CM

## (undated) NOTE — LETTER
Patient Name: Lizz Colmenares  : 1960  MRN: [id number]  Patient Address: [address] [facility] [address] [facility]      Coronavirus Disease 2019 (COVID-19)     [facility] is committed to the safety and well-being of our patients, members, e 2. Monitor your symptoms carefully. If your symptoms get worse, call your healthcare provider immediately. 3. Get rest and stay hydrated.    4. If you have a medical appointment, call the healthcare provider ahead of time and tell them that you have or may ? At least 24 hours have passed since recovery defined as resolution of fever without the use of fever-reducing medications; and  · Improvement in respiratory symptoms (e.g., cough, shortness of breath); and  · At least 10 days have passed since symptoms f If you would be interested in donating your plasma to help treat others diagnosed with the virus, please contact [place] directly on their website: [email or web address]    Who is eligible to donate convalescent plasma?

## (undated) NOTE — LETTER
ASTHMA ACTION PLAN for Tata Shane     : 1960     Date: 2023  Provider:  Laisha Alexander DO  Phone for doctor or clinic: [facility], [facility], [address] [id number] [phone]    ACT Score: 23      You can use the colors of a traffic light to help learn about your asthma medicines. 1. Green - Go! % of Personal Best Peak Flow Use controller medicine. Breathing is good  No cough or wheeze  Can work and play Medicine How much to take When to take it    Singulair 10 mg      2. Yellow - Caution. 50-79% Personal Best Peak  Flow. Use reliever medicine to keep an asthma attack from getting bad. Cough  Wheezing  Tight Chest  Wake up at night Medicine How much to take When to take it    Albuterol 2 puffs q 4-6 hours       Additional instructions Start advair 250/50 1 puff bid        3. Red - Stop! Danger!  <50% Personal Best Peak  Flow. Take these medications until  Get help from a doctor   Medicine not helping  Breathing is hard and fast  Nose opens wide  Can't walk  Ribs show  Can't talk well Medicine How much to take When to take it    Xoponex neb every 20 min x 3. If not improved to call 911 or go to ER. Additional Instructions If your symptoms do not improve and you cannot contact your doctor, [facility] or call 911 immediately! [x] Asthma Action Plan reviewed with patient (and caregiver if necessary) and a copy of the plan was given to the patient/caregiver. [] Asthma Action Plan reviewed with patient (and caregiver if necessary) on the phone and mailed copy to patient or submitted via [address]      Signatures:  Provider  Laisha Alexander DO   Patient Caretaker

## (undated) NOTE — LETTER
09/28/20        Ally Nash  [address]      Dear Reena Crockett records indicate that you have outstanding lab work and or testing that was ordered for you and has not yet been completed:  Orders Placed This Encounter

## (undated) NOTE — LETTER
Date: 1/12/2018    Patient Name: Starla Lopez          To Whom it may concern: This letter has been written at the patient's request. The above patient was seen at the [facility] for treatment of a medical condition.     This patient

## (undated) NOTE — LETTER
07/01/20        Ally Nash  [address]      Dear Henok Zamora records indicate that you have outstanding lab work and or testing that was ordered for you and has not yet been completed:  Orders Placed This Encounter

## (undated) NOTE — LETTER
Date: 1/12/2018    Patient Name: Abraham Hill          To Whom it may concern: This letter has been written at the patient's request. The above patient was seen at the [facility] for treatment of a medical condition.     This patient

## (undated) NOTE — LETTER
06/30/20        Ally Nash  [address]      Dear Alida Hancock records indicate that you have outstanding lab work and or testing that was ordered for you and has not yet been completed:  Orders Placed This Encounter

## (undated) NOTE — LETTER
ASTHMA ACTION PLAN for Argenis Sibley     : 1960          Date: 2017    Nora Cline [facility], [facility], [address] [place] [id number] [phone] 1.   GREEN - GO!  % Personal [x] Asthma Action Plan reviewed with patient (and caregiver if necessary) and a copy of the plan was given to the patient/caregiver. [] Asthma Action Plan reviewed with patient (and caregiver if necessary) on the phone and mailed copy to patient.

## (undated) NOTE — LETTER
03/05/21        Ally Nash  [address]      Dear Milton Frank records indicate that you have outstanding lab work and or testing that was ordered for you and has not yet been completed:  Orders Placed This Encounter

## (undated) NOTE — LETTER
2014 [address] [id number]  [address] [place] [address]  5/14/1960      To Whom it May Concern:    Kaitlyn Kimble is a patient of mine.   Patient had a ultrasound to rule

## (undated) NOTE — LETTER
[address] [id number]  [facility]        Dear Daniela Gutierrez,  To help us provide the highest quality care,